# Patient Record
Sex: FEMALE | Race: WHITE | Employment: UNEMPLOYED | URBAN - METROPOLITAN AREA
[De-identification: names, ages, dates, MRNs, and addresses within clinical notes are randomized per-mention and may not be internally consistent; named-entity substitution may affect disease eponyms.]

---

## 2017-06-09 ENCOUNTER — GENERIC CONVERSION - ENCOUNTER (OUTPATIENT)
Dept: OTHER | Facility: OTHER | Age: 42
End: 2017-06-09

## 2017-06-12 DIAGNOSIS — R92.8 OTHER ABNORMAL AND INCONCLUSIVE FINDINGS ON DIAGNOSTIC IMAGING OF BREAST: ICD-10-CM

## 2017-06-15 ENCOUNTER — GENERIC CONVERSION - ENCOUNTER (OUTPATIENT)
Dept: OTHER | Facility: OTHER | Age: 42
End: 2017-06-15

## 2018-01-09 NOTE — RESULT NOTES
Verified Results  (Q) THINPREP TIS PAP AND HPV mRNA E6/E7 REFLEX HPV 16,18/45 [id number] 12:00AM Leandro Schwarz     Test Name Result Flag Reference   CLINICAL INFORMATION:      G0 C   NO H/O ABNL PAP   LMP:      NONE GIVEN   PREV  PAP:      NONE GIVEN   PREV  BX:      NONE GIVEN   SOURCE:      Cervix, Endocervix   STATEMENT OF ADEQUACY:      Satisfactory for evaluation  Endocervical/transformation zone component  present  Age and/or menstrual status not provided   INTERPRETATION/RESULT:      Negative for intraepithelial lesion or malignancy  COMMENT:      This Pap test has been evaluated with computer  assisted technology  CYTOTECHNOLOGIST:      SARAH MORRISON(ASCP)  CT screening location: [address] [place] [address]   HPV mRNA E6/E7 Not Detected  Not Detected   This test was performed using the APTIMA HPV Assay ([employer] )  This assay detects E6/E7 viral messenger RNA (mRNA) from 14  high-risk HPV types (16,18,31,33,35,39,45,51,52,56,58,59,66,68)

## 2018-01-12 NOTE — MISCELLANEOUS
Message  6/13/17 1915: LMOM to call back  Has [address] contacted re rec for breast USG? BEO  6/19/17: f/u studies completed   [facility]      Signatures   Electronically signed by : AMERICO Barnett ; Jun 19 2017 12:59PM EST                       (Author)

## 2018-02-12 ENCOUNTER — OFFICE VISIT (OUTPATIENT)
Dept: OBGYN CLINIC | Facility: CLINIC | Age: 43
End: 2018-02-12
Payer: COMMERCIAL

## 2018-02-12 VITALS
BODY MASS INDEX: 23.66 KG/M2 | HEIGHT: 65 IN | WEIGHT: 142 LBS | DIASTOLIC BLOOD PRESSURE: 74 MMHG | SYSTOLIC BLOOD PRESSURE: 122 MMHG

## 2018-02-12 DIAGNOSIS — Z12.39 SCREENING BREAST EXAMINATION: ICD-10-CM

## 2018-02-12 DIAGNOSIS — N63.0 BREAST MASS: ICD-10-CM

## 2018-02-12 DIAGNOSIS — Z30.49 ENCOUNTER FOR SURVEILLANCE OF CONDOM CONTRACEPTION: ICD-10-CM

## 2018-02-12 DIAGNOSIS — Z01.411 ENCNTR FOR GYN EXAM (GENERAL) (ROUTINE) W ABNORMAL FINDINGS: Primary | ICD-10-CM

## 2018-02-12 DIAGNOSIS — R92.8 ABNORMAL MAMMOGRAM: ICD-10-CM

## 2018-02-12 DIAGNOSIS — Z20.2 EXPOSURE TO SEXUALLY TRANSMITTED DISEASE (STD): ICD-10-CM

## 2018-02-12 DIAGNOSIS — Z20.828 EXPOSURE TO VIRAL DISEASE: ICD-10-CM

## 2018-02-12 DIAGNOSIS — E58 DIETARY CALCIUM DEFICIENCY: ICD-10-CM

## 2018-02-12 PROCEDURE — S0612 ANNUAL GYNECOLOGICAL EXAMINA: HCPCS | Performed by: OBSTETRICS & GYNECOLOGY

## 2018-02-12 NOTE — PROGRESS NOTES
Pt is a 38 y o  [place] with Patient's last menstrual period was 2018 (exact date)  using condoms (male) for [facility] presents for preventive care  She notes the same partner but strong suspicion for his infidelity partner since her last STI evaluation  In her lifetime she has been involved with <5 partners   Safe sexual practices (monogomy, condoms) as above followed consistently  · She does  feel physically safe in the relationship  She does feel safe in her home  · Her calcium intake encompasses mvi and cheese for a total of 1-2 servings daily on average  She does not take additional Vitamin D (MVI or supplement)  · She exercises 6-7 times per week  Mucks stalls daily  · Her menses occur every month, last 4-5 days and require maxipad every 4-5 hours  Menstrual History:  OB History      Para Term  AB Living    0 0 0 0 0 0    SAB TAB Ectopic Multiple Live Births    0 0 0 0 0            ·      · She has never recieved an HPV vaccine  · tobacco use : does not use tobacco              · colonoscopy     Past Medical History:   Diagnosis Date    Benign neoplasm parotid gland     LEFT SUPERFICIAL PAROTID GLAND, BENIGN    Chlamydia     19-ROUTINE SCREEN    Depression     NO MEDS- DR Baltazar Powers    Gonorrhea     19- ROUTINE SCREEN    Simple ovarian cyst 10/2010    RIGHT, SIMPLE [id number] MM  STABLE THROUGH AT LEAST     Varicella        Past Surgical History:   Procedure Laterality Date    BREAST BIOPSY      : RIGHT BREAST, BENIGN  DR Bertha Murillo; 5/15: LEFT BREAST, BENIGN  HEMATOMA P-OP 9/15; 11/15: LEFT BENIGN CYST AND WALL; 9/15: LEFT AS REACCUMULATED AND NOW COMPOUND CYSTIC STRUCTURE    BREAST BIOPSY      : MULTIPLE, BENIGN DR VILLALTA; 5/15: LEFT BENIGN    KNEE SURGERY Left     REPAIR ANKLE LIGAMENT      I ANKLE BONE SPUR    SALIVARY GLAND SURGERY Left     EXCISION OF PARATID TUMOR/GLAND; PAROTID (SUPERFICIAL), BENIGN    SHOULDER SURGERY Right        OB History    Para Term  AB Living   0 0 0 0 0 0   SAB TAB Ectopic Multiple Live Births   0 0 0 0 0             Gyn HX:  chlamydia  and gonorrhea       Current Outpatient Prescriptions:     Multiple Vitamins-Minerals (MULTIVITAL) tablet, Take by mouth, Disp: , Rfl:     pramoxine (PROCTOFOAM) 1 % foam, INSERT INTO THE RECTUM EVERY 4 (FOUR) HOURS AS NEEDED FOR HEMORRHOIDS , Disp: , Rfl: 0    No Known Allergies    Social History     Social History    Marital status: /Civil Union     Spouse name: N/A    Number of children: N/A    Years of education: N/A     Social History Main Topics    Smoking status: Former Smoker     Packs/day: 1 00    Smokeless tobacco: Never Used      Comment: 19-25 yo     Alcohol use No    Drug use: No    Sexual activity: Yes     Partners: Male     Birth control/ protection: Condom Male     Other Topics Concern    None     Social History Narrative    None       Family History   Problem Relation Age of Onset    Depression Mother     Hyperlipidemia Mother     Hypertension Mother     Coronary artery disease Maternal Grandmother     Cervical cancer Maternal Grandmother      IN 50'S    Diabetes type II Paternal Grandmother     Diabetes type II Other        Blood pressure 122/74, height 5' 5" (1 651 m), weight 64 4 kg (142 lb), last menstrual period 2018  and Body mass index is 23 63 kg/m²  Physical Exam  Breast:multiple bilateral masses of varying sizes c/w hx multiple bilateral cysts   vulva: 3cm right  Bartholin's cyst, NT  vagina: color pink, rugae  well formed rugae and discharge  white  cervix: nullip, no lesions  and -CMT  uterus: NSSC, AF, NT, mobile  adnexa: no masses or tenderness  rectum: no masses or nodularity      A/P:  Pt is a 38 y o  [place] with slowly enlarging B's duct cyst   Pt states I&D x 2 in past   Option for Marsupialization presented  Pt desires to observe  Precuations reviewed      Diagnoses and all orders for this visit:    Encntr for gyn exam (general) (routine) w abnormal findings    Screening breast examination    Breast mass  -     Mammo diagnostic bilateral w 3d & cad; Future  -     US breast right limited (diagnostic); Future  -     US breast left limited (diagnostic); Future    Abnormal mammogram  -     Mammo diagnostic bilateral w 3d & cad; Future  -     US breast right limited (diagnostic); Future  -     US breast left limited (diagnostic); Future    Dietary calcium deficiency    Encounter for surveillance of condom contraception    Exposure to sexually transmitted disease (STD)  -     Chlamydia/N  gonorrhoeae RNA, TMA  -     RPR (Monitor) W/Refl Titer    Exposure to viral disease  -     HIV 1/2 Antigen/Antibody,Fourth Generation W/Rfl  -     Hepatitis B core antibody, IgM  -     Hepatitis B surface antibody;  Future  -     Hepatitis C antibody  -     Herpes I/II IgG Antibodies  -     Hepatitis B surface antibody

## 2018-02-13 LAB
C TRACH RRNA SPEC QL NAA+PROBE: NOT DETECTED
HBV CORE IGM SERPL QL IA: NORMAL
HBV SURFACE AB SERPL IA-ACNC: <5 MIU/ML
HCV AB S/CO SERPL IA: 0.01
HCV AB SERPL QL IA: NORMAL
HIV 1+2 AB+HIV1 P24 AG SERPL QL IA: NORMAL
HSV1 IGG SER IA-ACNC: <0.9 INDEX
HSV2 IGG SER IA-ACNC: <0.9 INDEX
N GONORRHOEA RRNA SPEC QL NAA+PROBE: NOT DETECTED
RPR SER QL: NORMAL

## 2019-06-17 DIAGNOSIS — N63.0 BREAST MASS: ICD-10-CM

## 2019-06-17 DIAGNOSIS — R92.8 ABNORMAL MAMMOGRAM: ICD-10-CM

## 2019-11-02 ENCOUNTER — APPOINTMENT (OUTPATIENT)
Dept: URGENT CARE | Age: 44
End: 2019-11-02

## 2019-11-02 ENCOUNTER — TRANSCRIBE ORDERS (OUTPATIENT)
Dept: URGENT CARE | Age: 44
End: 2019-11-02

## 2019-11-02 ENCOUNTER — APPOINTMENT (OUTPATIENT)
Dept: LAB | Age: 44
End: 2019-11-02

## 2019-11-02 DIAGNOSIS — Z13.9 SCREENING FOR UNSPECIFIED CONDITION: Primary | ICD-10-CM

## 2019-11-02 LAB
CHOLEST SERPL-MCNC: 208 MG/DL (ref 50–200)
GLUCOSE P FAST SERPL-MCNC: 86 MG/DL (ref 65–99)
HDLC SERPL-MCNC: 50 MG/DL
LDLC SERPL CALC-MCNC: 140 MG/DL (ref 0–100)
NONHDLC SERPL-MCNC: 158 MG/DL
TRIGL SERPL-MCNC: 89 MG/DL

## 2019-11-02 PROCEDURE — 80323 ALKALOIDS NOS: CPT

## 2019-11-02 PROCEDURE — 80061 LIPID PANEL: CPT

## 2019-11-02 PROCEDURE — 36415 COLL VENOUS BLD VENIPUNCTURE: CPT

## 2019-11-02 PROCEDURE — 82947 ASSAY GLUCOSE BLOOD QUANT: CPT

## 2019-11-08 LAB
COTININE SERPL-MCNC: NORMAL NG/ML
NICOTINE SERPL-MCNC: NORMAL NG/ML

## 2020-10-29 ENCOUNTER — OFFICE VISIT (OUTPATIENT)
Dept: FAMILY MEDICINE CLINIC | Facility: CLINIC | Age: 45
End: 2020-10-29
Payer: COMMERCIAL

## 2020-10-29 ENCOUNTER — HOSPITAL ENCOUNTER (OUTPATIENT)
Dept: CT IMAGING | Facility: HOSPITAL | Age: 45
Discharge: HOME | End: 2020-10-29
Payer: COMMERCIAL

## 2020-10-29 VITALS
SYSTOLIC BLOOD PRESSURE: 122 MMHG | OXYGEN SATURATION: 99 % | TEMPERATURE: 98.2 F | HEART RATE: 55 BPM | WEIGHT: 152 LBS | DIASTOLIC BLOOD PRESSURE: 80 MMHG | BODY MASS INDEX: 25.29 KG/M2

## 2020-10-29 DIAGNOSIS — Z13.29 THYROID DISORDER SCREENING: ICD-10-CM

## 2020-10-29 DIAGNOSIS — Z13.1 SCREENING FOR DIABETES MELLITUS: ICD-10-CM

## 2020-10-29 DIAGNOSIS — S06.0X0A CONCUSSION WITHOUT LOSS OF CONSCIOUSNESS, INITIAL ENCOUNTER: ICD-10-CM

## 2020-10-29 DIAGNOSIS — Z12.31 VISIT FOR SCREENING MAMMOGRAM: ICD-10-CM

## 2020-10-29 DIAGNOSIS — R00.1 SINUS BRADYCARDIA: ICD-10-CM

## 2020-10-29 DIAGNOSIS — Z01.419 VISIT FOR GYNECOLOGIC EXAMINATION: ICD-10-CM

## 2020-10-29 DIAGNOSIS — Z13.220 SCREENING, LIPID: ICD-10-CM

## 2020-10-29 DIAGNOSIS — M25.50 ARTHRALGIA, UNSPECIFIED JOINT: ICD-10-CM

## 2020-10-29 DIAGNOSIS — G89.29 CHRONIC MIDLINE LOW BACK PAIN WITHOUT SCIATICA: ICD-10-CM

## 2020-10-29 DIAGNOSIS — M54.50 CHRONIC MIDLINE LOW BACK PAIN WITHOUT SCIATICA: ICD-10-CM

## 2020-10-29 DIAGNOSIS — M25.572 ACUTE LEFT ANKLE PAIN: ICD-10-CM

## 2020-10-29 DIAGNOSIS — R55 VASOVAGAL SYNCOPE: ICD-10-CM

## 2020-10-29 DIAGNOSIS — Z13.21 ENCOUNTER FOR VITAMIN DEFICIENCY SCREENING: ICD-10-CM

## 2020-10-29 DIAGNOSIS — Z13.89 SCREENING FOR GENITOURINARY CONDITION: ICD-10-CM

## 2020-10-29 DIAGNOSIS — L91.8 CUTANEOUS SKIN TAGS: ICD-10-CM

## 2020-10-29 DIAGNOSIS — Z76.89 ENCOUNTER TO ESTABLISH CARE WITH NEW DOCTOR: Primary | ICD-10-CM

## 2020-10-29 PROCEDURE — 70450 CT HEAD/BRAIN W/O DYE: CPT

## 2020-10-29 PROCEDURE — 3725F SCREEN DEPRESSION PERFORMED: CPT | Performed by: INTERNAL MEDICINE

## 2020-10-29 PROCEDURE — G1004 CDSM NDSC: HCPCS

## 2020-10-29 PROCEDURE — 99204 OFFICE O/P NEW MOD 45 MIN: CPT | Performed by: INTERNAL MEDICINE

## 2020-11-19 ENCOUNTER — HOSPITAL ENCOUNTER (OUTPATIENT)
Dept: NON INVASIVE DIAGNOSTICS | Facility: HOSPITAL | Age: 45
Discharge: HOME | End: 2020-11-19
Payer: COMMERCIAL

## 2020-11-19 DIAGNOSIS — R00.1 SINUS BRADYCARDIA: ICD-10-CM

## 2020-11-19 DIAGNOSIS — R55 VASOVAGAL SYNCOPE: ICD-10-CM

## 2020-11-19 PROCEDURE — 93226 XTRNL ECG REC<48 HR SCAN A/R: CPT

## 2020-11-19 PROCEDURE — 93225 XTRNL ECG REC<48 HRS REC: CPT

## 2020-11-21 ENCOUNTER — HOSPITAL ENCOUNTER (OUTPATIENT)
Dept: RADIOLOGY | Facility: HOSPITAL | Age: 45
Discharge: HOME | End: 2020-11-21
Payer: COMMERCIAL

## 2020-11-21 DIAGNOSIS — M25.572 ACUTE LEFT ANKLE PAIN: ICD-10-CM

## 2020-11-21 DIAGNOSIS — M54.50 CHRONIC MIDLINE LOW BACK PAIN WITHOUT SCIATICA: ICD-10-CM

## 2020-11-21 DIAGNOSIS — G89.29 CHRONIC MIDLINE LOW BACK PAIN WITHOUT SCIATICA: ICD-10-CM

## 2020-11-21 DIAGNOSIS — S06.0X0A CONCUSSION WITHOUT LOSS OF CONSCIOUSNESS, INITIAL ENCOUNTER: ICD-10-CM

## 2020-11-21 PROCEDURE — 72100 X-RAY EXAM L-S SPINE 2/3 VWS: CPT

## 2020-11-21 PROCEDURE — 73610 X-RAY EXAM OF ANKLE: CPT

## 2020-11-23 LAB
25(OH)D3 SERPL-MCNC: 24 NG/ML (ref 30–100)
ALBUMIN SERPL-MCNC: 4.5 G/DL (ref 3.6–5.1)
ALBUMIN/GLOB SERPL: 1.8 (CALC) (ref 1–2.5)
ALP SERPL-CCNC: 57 U/L (ref 31–125)
ALT SERPL-CCNC: 13 U/L (ref 6–29)
APPEARANCE UR: CLEAR
AST SERPL-CCNC: 14 U/L (ref 10–30)
B BURGDOR AB SER IA-ACNC: <0.9 INDEX
BACTERIA UR QL AUTO: ABNORMAL /HPF
BASOPHILS # BLD AUTO: 92 CELLS/UL (ref 0–200)
BASOPHILS NFR BLD AUTO: 1.7 %
BILIRUB SERPL-MCNC: 0.5 MG/DL (ref 0.2–1.2)
BILIRUB UR QL STRIP: NEGATIVE
BUN SERPL-MCNC: 9 MG/DL (ref 7–25)
BUN/CREAT SERPL: NORMAL (CALC) (ref 6–22)
CALCIUM SERPL-MCNC: 9.7 MG/DL (ref 8.6–10.2)
CHLORIDE SERPL-SCNC: 106 MMOL/L (ref 98–110)
CHOLEST SERPL-MCNC: 212 MG/DL
CHOLEST/HDLC SERPL: 4.4 (CALC)
CO2 SERPL-SCNC: 26 MMOL/L (ref 20–32)
COLOR UR: YELLOW
CREAT SERPL-MCNC: 0.78 MG/DL (ref 0.5–1.1)
EOSINOPHIL # BLD AUTO: 302 CELLS/UL (ref 15–500)
EOSINOPHIL NFR BLD AUTO: 5.6 %
ERYTHROCYTE [DISTWIDTH] IN BLOOD BY AUTOMATED COUNT: 15.3 % (ref 11–15)
GLOBULIN SER CALC-MCNC: 2.5 G/DL (CALC) (ref 1.9–3.7)
GLUCOSE SERPL-MCNC: 82 MG/DL (ref 65–99)
GLUCOSE UR QL STRIP: NEGATIVE
HCT VFR BLD AUTO: 34.3 % (ref 35–45)
HDLC SERPL-MCNC: 48 MG/DL
HGB BLD-MCNC: 10.8 G/DL (ref 11.7–15.5)
HGB UR QL STRIP: ABNORMAL
HYALINE CASTS #/AREA URNS LPF: ABNORMAL /LPF
KETONES UR QL STRIP: NEGATIVE
LDLC SERPL CALC-MCNC: 143 MG/DL (CALC)
LEUKOCYTE ESTERASE UR QL STRIP: NEGATIVE
LYMPHOCYTES # BLD AUTO: 1447 CELLS/UL (ref 850–3900)
LYMPHOCYTES NFR BLD AUTO: 26.8 %
MCH RBC QN AUTO: 25.7 PG (ref 27–33)
MCHC RBC AUTO-ENTMCNC: 31.5 G/DL (ref 32–36)
MCV RBC AUTO: 81.5 FL (ref 80–100)
MONOCYTES # BLD AUTO: 432 CELLS/UL (ref 200–950)
MONOCYTES NFR BLD AUTO: 8 %
NEUTROPHILS # BLD AUTO: 3127 CELLS/UL (ref 1500–7800)
NEUTROPHILS NFR BLD AUTO: 57.9 %
NITRITE UR QL STRIP: NEGATIVE
NONHDLC SERPL-MCNC: 164 MG/DL (CALC)
PH UR STRIP: 7 [PH] (ref 5–8)
PLATELET # BLD AUTO: 356 THOUSAND/UL (ref 140–400)
PMV BLD REES-ECKER: 10.7 FL (ref 7.5–12.5)
POTASSIUM SERPL-SCNC: 4.2 MMOL/L (ref 3.5–5.3)
PROT SERPL-MCNC: 7 G/DL (ref 6.1–8.1)
PROT UR QL STRIP: NEGATIVE
RBC # BLD AUTO: 4.21 MILLION/UL (ref 3.8–5.1)
RBC #/AREA URNS HPF: ABNORMAL /HPF
SL AMB EGFR AFRICAN AMERICAN: 108 ML/MIN/1.73M2
SL AMB EGFR NON AFRICAN AMERICAN: 93 ML/MIN/1.73M2
SODIUM SERPL-SCNC: 139 MMOL/L (ref 135–146)
SP GR UR STRIP: 1 (ref 1–1.03)
SQUAMOUS #/AREA URNS HPF: ABNORMAL /HPF
TRIGL SERPL-MCNC: 98 MG/DL
TSH SERPL-ACNC: 2.04 MIU/L
WBC # BLD AUTO: 5.4 THOUSAND/UL (ref 3.8–10.8)
WBC #/AREA URNS HPF: ABNORMAL /HPF

## 2020-11-25 ENCOUNTER — TELEPHONE (OUTPATIENT)
Dept: FAMILY MEDICINE CLINIC | Facility: CLINIC | Age: 45
End: 2020-11-25

## 2020-11-27 PROCEDURE — 93227 XTRNL ECG REC<48 HR R&I: CPT | Performed by: INTERNAL MEDICINE

## 2020-11-30 ENCOUNTER — OFFICE VISIT (OUTPATIENT)
Dept: FAMILY MEDICINE CLINIC | Facility: CLINIC | Age: 45
End: 2020-11-30
Payer: COMMERCIAL

## 2020-11-30 VITALS
WEIGHT: 153 LBS | HEART RATE: 64 BPM | BODY MASS INDEX: 25.49 KG/M2 | SYSTOLIC BLOOD PRESSURE: 100 MMHG | DIASTOLIC BLOOD PRESSURE: 62 MMHG | TEMPERATURE: 98.5 F | HEIGHT: 65 IN | OXYGEN SATURATION: 99 %

## 2020-11-30 DIAGNOSIS — E55.9 VITAMIN D DEFICIENCY: ICD-10-CM

## 2020-11-30 DIAGNOSIS — R31.9 HEMATURIA, UNSPECIFIED TYPE: ICD-10-CM

## 2020-11-30 DIAGNOSIS — E78.49 OTHER HYPERLIPIDEMIA: ICD-10-CM

## 2020-11-30 DIAGNOSIS — D64.9 ANEMIA, UNSPECIFIED TYPE: Primary | ICD-10-CM

## 2020-11-30 PROCEDURE — 99213 OFFICE O/P EST LOW 20 MIN: CPT | Performed by: INTERNAL MEDICINE

## 2020-11-30 PROCEDURE — 3008F BODY MASS INDEX DOCD: CPT | Performed by: INTERNAL MEDICINE

## 2020-12-03 LAB
FERRITIN SERPL-MCNC: 5 NG/ML (ref 16–232)
IRON SATN MFR SERPL: 6 % (CALC) (ref 16–45)
IRON SERPL-MCNC: 21 MCG/DL (ref 40–190)
TIBC SERPL-MCNC: 365 MCG/DL (CALC) (ref 250–450)
VIT B12 SERPL-MCNC: 460 PG/ML (ref 200–1100)

## 2020-12-04 ENCOUNTER — TELEPHONE (OUTPATIENT)
Dept: FAMILY MEDICINE CLINIC | Facility: CLINIC | Age: 45
End: 2020-12-04

## 2020-12-07 ENCOUNTER — TELEPHONE (OUTPATIENT)
Dept: FAMILY MEDICINE CLINIC | Facility: CLINIC | Age: 45
End: 2020-12-07

## 2020-12-07 PROBLEM — D64.9 ANEMIA: Status: ACTIVE | Noted: 2020-12-07

## 2020-12-07 RX ORDER — SODIUM CHLORIDE 9 MG/ML
20 INJECTION, SOLUTION INTRAVENOUS ONCE
Status: CANCELLED | OUTPATIENT
Start: 2020-12-08

## 2020-12-15 ENCOUNTER — TELEPHONE (OUTPATIENT)
Dept: FAMILY MEDICINE CLINIC | Facility: CLINIC | Age: 45
End: 2020-12-15

## 2020-12-29 ENCOUNTER — HOSPITAL ENCOUNTER (OUTPATIENT)
Dept: INFUSION CENTER | Facility: CLINIC | Age: 45
Discharge: HOME | End: 2020-12-29
Payer: COMMERCIAL

## 2020-12-29 VITALS
TEMPERATURE: 98.4 F | DIASTOLIC BLOOD PRESSURE: 77 MMHG | SYSTOLIC BLOOD PRESSURE: 135 MMHG | HEART RATE: 78 BPM | RESPIRATION RATE: 20 BRPM

## 2020-12-29 DIAGNOSIS — D64.9 ANEMIA, UNSPECIFIED TYPE: Primary | ICD-10-CM

## 2020-12-29 PROCEDURE — 96365 THER/PROPH/DIAG IV INF INIT: CPT

## 2020-12-29 RX ORDER — SODIUM CHLORIDE 9 MG/ML
20 INJECTION, SOLUTION INTRAVENOUS ONCE
Status: COMPLETED | OUTPATIENT
Start: 2020-12-29 | End: 2020-12-29

## 2020-12-29 RX ORDER — SODIUM CHLORIDE 9 MG/ML
20 INJECTION, SOLUTION INTRAVENOUS ONCE
Status: CANCELLED | OUTPATIENT
Start: 2021-01-05

## 2020-12-29 RX ORDER — OMEGA-3S/DHA/EPA/FISH OIL/D3 300MG-1000
400 CAPSULE ORAL DAILY
COMMUNITY

## 2020-12-29 RX ORDER — MULTIVITAMIN
1 TABLET ORAL DAILY
COMMUNITY

## 2020-12-29 RX ADMIN — IRON SUCROSE 100 MG: 20 INJECTION, SOLUTION INTRAVENOUS at 14:08

## 2020-12-29 RX ADMIN — SODIUM CHLORIDE 20 ML/HR: 0.9 INJECTION, SOLUTION INTRAVENOUS at 14:08

## 2020-12-29 NOTE — PLAN OF CARE
Problem: Potential for Falls  Goal: Patient will remain free of falls  Description: INTERVENTIONS:  - Assess patient frequently for physical needs  -  Identify cognitive and physical deficits and behaviors that affect risk of falls    -  [place] fall precautions as indicated by assessment   - Educate patient/family on patient safety including physical limitations  - Instruct patient to call for assistance with activity based on assessment  - Modify environment to reduce risk of injury  - Consider OT/PT consult to assist with strengthening/mobility  Outcome: Progressing

## 2020-12-29 NOTE — PROGRESS NOTES
Pt arrived to unit without complaint  Venofer 100mg administered as per orders and clarification with Dr Erwin Hidden via [facility] text  Pt tolerated well, AVS provided  Pt left unit in stable condition without question or concern

## 2020-12-30 DIAGNOSIS — D64.9 ANEMIA, UNSPECIFIED TYPE: Primary | ICD-10-CM

## 2020-12-30 RX ORDER — SODIUM CHLORIDE 9 MG/ML
20 INJECTION, SOLUTION INTRAVENOUS ONCE
Status: CANCELLED | OUTPATIENT
Start: 2020-12-30

## 2020-12-31 ENCOUNTER — TELEPHONE (OUTPATIENT)
Dept: FAMILY MEDICINE CLINIC | Facility: CLINIC | Age: 45
End: 2020-12-31

## 2021-01-05 ENCOUNTER — CONSULT (OUTPATIENT)
Dept: GASTROENTEROLOGY | Facility: MEDICAL CENTER | Age: 46
End: 2021-01-05
Payer: COMMERCIAL

## 2021-01-05 VITALS
DIASTOLIC BLOOD PRESSURE: 68 MMHG | HEIGHT: 65 IN | WEIGHT: 155 LBS | HEART RATE: 65 BPM | SYSTOLIC BLOOD PRESSURE: 147 MMHG | BODY MASS INDEX: 25.83 KG/M2 | TEMPERATURE: 98 F

## 2021-01-05 DIAGNOSIS — R31.9 HEMATURIA, UNSPECIFIED TYPE: ICD-10-CM

## 2021-01-05 DIAGNOSIS — D64.9 ANEMIA, UNSPECIFIED TYPE: ICD-10-CM

## 2021-01-05 DIAGNOSIS — D50.8 OTHER IRON DEFICIENCY ANEMIA: Primary | ICD-10-CM

## 2021-01-05 PROCEDURE — 99204 OFFICE O/P NEW MOD 45 MIN: CPT | Performed by: INTERNAL MEDICINE

## 2021-01-05 PROCEDURE — 3008F BODY MASS INDEX DOCD: CPT | Performed by: INTERNAL MEDICINE

## 2021-01-05 PROCEDURE — 1036F TOBACCO NON-USER: CPT | Performed by: INTERNAL MEDICINE

## 2021-01-05 RX ORDER — SODIUM, POTASSIUM,MAG SULFATES 17.5-3.13G
1 SOLUTION, RECONSTITUTED, ORAL ORAL ONCE
Qty: 1 BOTTLE | Refills: 0 | Status: SHIPPED | OUTPATIENT
Start: 2021-01-05 | End: 2021-04-15

## 2021-01-05 NOTE — PATIENT INSTRUCTIONS
The patient is scheduled at [facility] for a colon/egd with Anibal Frank on 03/19/2021  suprep prep instructions have been gone over in the office, with the patient, by the MA  The patient is aware that they will receive a call with the arrival time the day prior to procedure and that they will need a  the day of the procedure   I have asked the patient to call with any questions that they might have prior to procedure

## 2021-01-05 NOTE — PROGRESS NOTES
[facility] - Outpatient Consultation  Lauren Miles 39 y o  female MRN: [id number]  Encounter: [id number]          ASSESSMENT AND PLAN:            1  Other iron deficiency anemia  2  Hematuria    Patient iron deficiency anemia is most likely caused by heavy menstrual loss  However I would recommend to do an EGD and colonoscopy to rule out occult GI lesions  Labs to rule out celiac disease  Plan for duodenal biopsy to rule out celiac disease     - Celiac Disease Antibody Profile; Future  - Na Sulfate-K Sulfate-Mg Sulf (Suprep Bowel Prep Kit) 17 5-3 13-1 6 GM/177ML SOLN; Take 1 Bottle by mouth once for 1 dose  Dispense: 1 Bottle; Refill: 0  - Colonoscopy; Future  - EGD; Future    ______________________________________________________________________    HPI:      43-year-old female referred by Dr Jesika Chaidez for evaluation of iron deficiency anemia  Patient was found to have iron deficiency anemia during routine checkup  She has normocytic anemia with hemoglobin of 10 8 in iron panel showed iron deficiency  Her fecal occult blood test was pending  She denies any overt GI symptoms  She has been avoiding dairy product in her diet and she denies abdominal pain  She overall has regular bowel movement  Denies blood in the stool  She reported intermittent nausea but no vomiting  She denies sense of incomplete evacuation  She never had an upper endoscopy and colonoscopy  She reported heavy menstrual cycles  She reported sometimes her urine has blood during her menstrual cycle  REVIEW OF SYSTEMS:    CONSTITUTIONAL: Denies any fever, chills, rigors, and weight loss  HEENT: No earache or tinnitus  Denies hearing loss or visual disturbances  CARDIOVASCULAR: No chest pain or palpitations  RESPIRATORY: Denies any cough, hemoptysis, shortness of breath or dyspnea on exertion  GASTROINTESTINAL: As noted in the History of Present Illness  GENITOURINARY: No problems with urination   Denies any hematuria or dysuria  NEUROLOGIC: No dizziness or vertigo, denies headaches  MUSCULOSKELETAL: Denies any muscle or joint pain  SKIN: Denies skin rashes or itching  ENDOCRINE: Denies excessive thirst  Denies intolerance to heat or cold  PSYCHOSOCIAL: Denies depression or anxiety  Denies any recent memory loss  Historical Information   Past Medical History:   Diagnosis Date    Benign neoplasm parotid gland     LEFT SUPERFICIAL PAROTID GLAND, BENIGN    Chlamydia     19-ROUTINE SCREEN    Depression     NO MEDS- DR Michelle Macias    Gonorrhea     25- ROUTINE SCREEN    Simple ovarian cyst 10/2010    RIGHT, SIMPLE [id number] MM  STABLE THROUGH AT LEAST 9/13    Varicella      Past Surgical History:   Procedure Laterality Date    BREAST BIOPSY      2011: RIGHT BREAST, BENIGN  DR Amelia Ruvalcaba; 5/15: LEFT BREAST, BENIGN  HEMATOMA P-OP 9/15; 11/15: LEFT BENIGN CYST AND WALL; 9/15: LEFT AS REACCUMULATED AND NOW COMPOUND CYSTIC STRUCTURE    BREAST BIOPSY      2003/04: MULTIPLE, BENIGN DR Amelia Ruvalcaba; 5/15: LEFT BENIGN    BREAST LUMPECTOMY      L breast     KNEE SURGERY Left     REPAIR ANKLE LIGAMENT      I ANKLE BONE SPUR    SALIVARY GLAND SURGERY Left 2012    EXCISION OF PARATID TUMOR/GLAND; PAROTID (SUPERFICIAL), BENIGN    SHOULDER SURGERY Right      Social History   Social History     Substance and Sexual Activity   Alcohol Use No     Social History     Substance and Sexual Activity   Drug Use No     Social History     Tobacco Use   Smoking Status Former Smoker    Packs/day: 1 00   Smokeless Tobacco Never Used   Tobacco Comment    19-25 yo      Family History   Problem Relation Age of Onset    Depression Mother     Hyperlipidemia Mother     Hypertension Mother     Coronary artery disease Maternal Grandmother     Cervical cancer Maternal Grandmother         IN 50'S    Diabetes type II Paternal Grandmother     Diabetes type II Other        Meds/Allergies       Current Outpatient Medications:    cholecalciferol (VITAMIN D3) 400 units tablet    Multiple Vitamin (multivitamin) tablet    Na Sulfate-K Sulfate-Mg Sulf (Suprep Bowel Prep Kit) 17 5-3 13-1 6 GM/177ML SOLN    No Known Allergies        Objective     Blood pressure 147/68, pulse 65, temperature 98 °F (36 7 °C), temperature source Tympanic Core, height 5' 5" (1 651 m), weight 70 3 kg (155 lb), last menstrual period 12/08/2020, not currently breastfeeding  Body mass index is 25 79 kg/m²  PHYSICAL EXAM:      General Appearance:   Alert, cooperative, no distress   HEENT:   Normocephalic, atraumatic, anicteric      Neck:  Supple, symmetrical, trachea midline   Lungs:   Clear to auscultation bilaterally; no rales, rhonchi or wheezing; respirations unlabored    Heart[de-identified]   Regular rate and rhythm; no murmur, rub, or gallop  Abdomen:   Soft, non-tender, non-distended; normal bowel sounds; no masses, no organomegaly    Genitalia:   Deferred    Rectal:   Deferred    Extremities:  No cyanosis, clubbing or edema    Pulses:  2+ and symmetric    Skin:  No jaundice, rashes, or lesions    Lymph nodes:  No palpable cervical lymphadenopathy        Lab Results:   No visits with results within 1 Day(s) from this visit  Latest known visit with results is:   Office Visit on 11/30/2020   Component Date Value    Vitamin B-12 12/02/2020 460     Iron, Serum 12/02/2020 21*    Total Iron Binding [place]* 12/02/2020 365     Iron Saturation 12/02/2020 6*    Ferritin 12/02/2020 5*         Radiology Results:   No results found

## 2021-01-07 ENCOUNTER — HOSPITAL ENCOUNTER (OUTPATIENT)
Dept: INFUSION CENTER | Facility: CLINIC | Age: 46
Discharge: HOME | End: 2021-01-07
Payer: COMMERCIAL

## 2021-01-07 VITALS
HEART RATE: 63 BPM | DIASTOLIC BLOOD PRESSURE: 76 MMHG | SYSTOLIC BLOOD PRESSURE: 128 MMHG | RESPIRATION RATE: 18 BRPM | TEMPERATURE: 97.9 F

## 2021-01-07 DIAGNOSIS — D64.9 ANEMIA, UNSPECIFIED TYPE: Primary | ICD-10-CM

## 2021-01-07 PROCEDURE — 96365 THER/PROPH/DIAG IV INF INIT: CPT

## 2021-01-07 RX ORDER — SODIUM CHLORIDE 9 MG/ML
20 INJECTION, SOLUTION INTRAVENOUS ONCE
Status: COMPLETED | OUTPATIENT
Start: 2021-01-07 | End: 2021-01-07

## 2021-01-07 RX ORDER — SODIUM CHLORIDE 9 MG/ML
20 INJECTION, SOLUTION INTRAVENOUS ONCE
Status: CANCELLED | OUTPATIENT
Start: 2021-01-13

## 2021-01-07 RX ADMIN — IRON SUCROSE 100 MG: 20 INJECTION, SOLUTION INTRAVENOUS at 09:30

## 2021-01-07 RX ADMIN — SODIUM CHLORIDE 20 ML/HR: 0.9 INJECTION, SOLUTION INTRAVENOUS at 09:13

## 2021-01-07 NOTE — PLAN OF CARE
Problem: Potential for Falls  Goal: Patient will remain free of falls  Description: INTERVENTIONS:  - Assess patient frequently for physical needs  -  Identify cognitive and physical deficits and behaviors that affect risk of falls    -  [place] fall precautions as indicated by assessment   - Educate patient/family on patient safety including physical limitations  - Instruct patient to call for assistance with activity based on assessment  - Modify environment to reduce risk of injury  - Consider OT/PT consult to assist with strengthening/mobility  Outcome: Progressing

## 2021-01-13 ENCOUNTER — HOSPITAL ENCOUNTER (OUTPATIENT)
Dept: INFUSION CENTER | Facility: CLINIC | Age: 46
Discharge: HOME | End: 2021-01-13
Payer: COMMERCIAL

## 2021-01-13 VITALS
DIASTOLIC BLOOD PRESSURE: 85 MMHG | RESPIRATION RATE: 18 BRPM | SYSTOLIC BLOOD PRESSURE: 134 MMHG | HEART RATE: 66 BPM | TEMPERATURE: 97.6 F

## 2021-01-13 DIAGNOSIS — D64.9 ANEMIA, UNSPECIFIED TYPE: Primary | ICD-10-CM

## 2021-01-13 PROCEDURE — 96365 THER/PROPH/DIAG IV INF INIT: CPT

## 2021-01-13 RX ORDER — SODIUM CHLORIDE 9 MG/ML
20 INJECTION, SOLUTION INTRAVENOUS ONCE
Status: CANCELLED | OUTPATIENT
Start: 2021-01-20

## 2021-01-13 RX ORDER — SODIUM CHLORIDE 9 MG/ML
20 INJECTION, SOLUTION INTRAVENOUS ONCE
Status: COMPLETED | OUTPATIENT
Start: 2021-01-13 | End: 2021-01-13

## 2021-01-13 RX ADMIN — SODIUM CHLORIDE 20 ML/HR: 9 INJECTION, SOLUTION INTRAVENOUS at 09:45

## 2021-01-13 RX ADMIN — IRON SUCROSE 100 MG: 20 INJECTION, SOLUTION INTRAVENOUS at 09:45

## 2021-01-13 NOTE — PROGRESS NOTES
Patient tolerated Venofer infusion well with no adverse events or complications  Pt is aware of all future appointments   Declined AVS

## 2021-01-20 ENCOUNTER — HOSPITAL ENCOUNTER (OUTPATIENT)
Dept: INFUSION CENTER | Facility: CLINIC | Age: 46
Discharge: HOME | End: 2021-01-20
Payer: COMMERCIAL

## 2021-01-20 VITALS
RESPIRATION RATE: 18 BRPM | TEMPERATURE: 98 F | DIASTOLIC BLOOD PRESSURE: 72 MMHG | SYSTOLIC BLOOD PRESSURE: 129 MMHG | HEART RATE: 93 BPM

## 2021-01-20 DIAGNOSIS — D64.9 ANEMIA, UNSPECIFIED TYPE: Primary | ICD-10-CM

## 2021-01-20 PROCEDURE — 96365 THER/PROPH/DIAG IV INF INIT: CPT

## 2021-01-20 RX ORDER — SODIUM CHLORIDE 9 MG/ML
20 INJECTION, SOLUTION INTRAVENOUS ONCE
Status: COMPLETED | OUTPATIENT
Start: 2021-01-20 | End: 2021-01-20

## 2021-01-20 RX ORDER — SODIUM CHLORIDE 9 MG/ML
20 INJECTION, SOLUTION INTRAVENOUS ONCE
Status: CANCELLED | OUTPATIENT
Start: 2021-01-27

## 2021-01-20 RX ADMIN — IRON SUCROSE 100 MG: 20 INJECTION, SOLUTION INTRAVENOUS at 10:05

## 2021-01-20 RX ADMIN — SODIUM CHLORIDE 20 ML/HR: 0.9 INJECTION, SOLUTION INTRAVENOUS at 10:04

## 2021-01-20 NOTE — PLAN OF CARE
Problem: Knowledge Deficit  Goal: Patient/family/caregiver demonstrates understanding of disease process, treatment plan, medications, and discharge instructions  Description: Complete learning assessment and assess knowledge base  Interventions:  - Provide teaching at level of understanding  - Provide teaching via preferred learning methods  Outcome: Progressing     Problem: Potential for Falls  Goal: Patient will remain free of falls  Description: INTERVENTIONS:  - Assess patient frequently for physical needs  -  Identify cognitive and physical deficits and behaviors that affect risk of falls    -  [place] fall precautions as indicated by assessment   - Educate patient/family on patient safety including physical limitations  - Instruct patient to call for assistance with activity based on assessment  - Modify environment to reduce risk of injury  - Consider OT/PT consult to assist with strengthening/mobility  Outcome: Progressing

## 2021-01-27 ENCOUNTER — HOSPITAL ENCOUNTER (OUTPATIENT)
Dept: INFUSION CENTER | Facility: CLINIC | Age: 46
Discharge: HOME | End: 2021-01-27
Payer: COMMERCIAL

## 2021-01-27 VITALS
HEART RATE: 67 BPM | DIASTOLIC BLOOD PRESSURE: 76 MMHG | SYSTOLIC BLOOD PRESSURE: 114 MMHG | RESPIRATION RATE: 16 BRPM | TEMPERATURE: 98.1 F

## 2021-01-27 DIAGNOSIS — D64.9 ANEMIA, UNSPECIFIED TYPE: Primary | ICD-10-CM

## 2021-01-27 PROCEDURE — 96365 THER/PROPH/DIAG IV INF INIT: CPT

## 2021-01-27 RX ORDER — SODIUM CHLORIDE 9 MG/ML
20 INJECTION, SOLUTION INTRAVENOUS ONCE
Status: CANCELLED | OUTPATIENT
Start: 2021-02-03

## 2021-01-27 RX ORDER — SODIUM CHLORIDE 9 MG/ML
20 INJECTION, SOLUTION INTRAVENOUS ONCE
Status: COMPLETED | OUTPATIENT
Start: 2021-01-27 | End: 2021-01-27

## 2021-01-27 RX ADMIN — IRON SUCROSE 100 MG: 20 INJECTION, SOLUTION INTRAVENOUS at 10:05

## 2021-01-27 RX ADMIN — SODIUM CHLORIDE 20 ML/HR: 0.9 INJECTION, SOLUTION INTRAVENOUS at 09:40

## 2021-01-27 NOTE — PROGRESS NOTES
Pt  Tolerated Venofer without adverse event  There are no future orders for Venofer noted at this time  Pt  Will follow up with physician as ordered  AVS provided

## 2021-03-03 ENCOUNTER — HOSPITAL ENCOUNTER (OUTPATIENT)
Dept: MAMMOGRAPHY | Facility: MEDICAL CENTER | Age: 46
Discharge: HOME | End: 2021-03-03
Payer: COMMERCIAL

## 2021-03-03 VITALS — BODY MASS INDEX: 25.83 KG/M2 | HEIGHT: 65 IN | WEIGHT: 155 LBS

## 2021-03-03 DIAGNOSIS — Z12.31 VISIT FOR SCREENING MAMMOGRAM: ICD-10-CM

## 2021-03-03 PROCEDURE — 77063 BREAST TOMOSYNTHESIS BI: CPT

## 2021-03-03 PROCEDURE — 77067 SCR MAMMO BI INCL CAD: CPT

## 2021-03-08 ENCOUNTER — TELEPHONE (OUTPATIENT)
Dept: GASTROENTEROLOGY | Facility: CLINIC | Age: 46
End: 2021-03-08

## 2021-03-08 NOTE — TELEPHONE ENCOUNTER
Patients GI provider:  Dr Greene Spine    Number to return call: [phone]    Reason for call: Pt calling to cx her procedures and does not wish to r/s at this time      Scheduled procedure/appointment date if applicable: Procedure - 03/19/21

## 2021-03-17 ENCOUNTER — TELEPHONE (OUTPATIENT)
Dept: NEUROLOGY | Facility: CLINIC | Age: 46
End: 2021-03-17

## 2021-03-17 NOTE — TELEPHONE ENCOUNTER
Called and left a vm for pt that Dr Wanda Esparza has a cancellation tomorrow 3/18/21 at [address] [place] [address] in [address] [place] [address] office  If interested please call us to schedule  Apt is first come first served basis

## 2021-03-26 ENCOUNTER — TELEPHONE (OUTPATIENT)
Dept: NEUROLOGY | Facility: CLINIC | Age: 46
End: 2021-03-26

## 2021-03-26 NOTE — TELEPHONE ENCOUNTER
I called and left a voicemail message about patients upcoming appointment with     I requested a call back to our office if the patient has any issues or concerns or cannot keep this appointment

## 2021-04-07 NOTE — TELEPHONE ENCOUNTER
I called and left a voicemail message about patients upcoming appointment with     I requested a call back to our office if the patient has any issues or concerns or cannot keep this appointment  Called and spoke to pt's  Bassam Pena - he stated that he could not confirm pt's apt, however he will let pt know that we have been trying to get a hold of her to please call our office to confirm apt or reschedule if she cannot make it

## 2021-04-08 ENCOUNTER — CONSULT (OUTPATIENT)
Dept: NEUROLOGY | Facility: CLINIC | Age: 46
End: 2021-04-08
Payer: COMMERCIAL

## 2021-04-08 VITALS
HEIGHT: 65 IN | HEART RATE: 72 BPM | WEIGHT: 163 LBS | DIASTOLIC BLOOD PRESSURE: 58 MMHG | BODY MASS INDEX: 27.16 KG/M2 | SYSTOLIC BLOOD PRESSURE: 110 MMHG

## 2021-04-08 DIAGNOSIS — Z87.820 H/O CONCUSSION: ICD-10-CM

## 2021-04-08 DIAGNOSIS — G44.329 CHRONIC POST-TRAUMATIC HEADACHE, NOT INTRACTABLE: Primary | ICD-10-CM

## 2021-04-08 PROCEDURE — 1036F TOBACCO NON-USER: CPT | Performed by: PSYCHIATRY & NEUROLOGY

## 2021-04-08 PROCEDURE — 99205 OFFICE O/P NEW HI 60 MIN: CPT | Performed by: PSYCHIATRY & NEUROLOGY

## 2021-04-08 NOTE — PATIENT INSTRUCTIONS
"This might hurt" - thismighthurtfilm  com  "all the rage: Saved by Lanre Vega" - on [employer]     "healing back pain" By Dr Nick Amato is over  Post traumatic headaches should continue to improve with time     Headache/migraine treatment:   Abortive medications (for immediate treatment of a headache): Ok to take ibuprofen or acetaminophen for headaches, but try to limit the amount and frequency that you are taking to avoid medication overuse/rebound headache  Ideally no more than 3 days per week  Over the counter preventive supplements for headaches/migraines - try for 2-3 months at least   (to take every day to help prevent headaches - not to take at the time of headache):  - Magnesium 400mg daily  - Can occasionally cause stomach upset - if so try at night, with food or stop, rarely can cause diarrhea if so stop  Prescription preventive medications for headaches/migraines   (to take every day to help prevent headaches - not to take at the time of headache): Not indicated at this time     Lifestyle Recommendations:  - Maintain good sleep hygiene  Going to bed and waking up at consistent times, avoiding excessive daytime naps, avoiding caffeinated beverages in the evening, avoid excessive stimulation in the evening and generally using bed primarily for sleeping  One hour before bedtime would recommend turning lights down lower, decreasing your activity (may read quietly, listen to music at a low volume)  When you get into bed, should eliminate all technology (no texting, emailing, playing with your phone, iPad or tablet in bed)  - Maintain good hydration  Drink  2L of fluid a day (4 typical small water bottles)  - Maintain good nutrition  In particular don't skip meals and eat balanced meals regularly  Education and Follow-up  - Please contact us if any questions or concerns arise   Of course, try to protect yourself from head injuries, and if any new concerning symptoms or significant blow to the head or body go to the emergency department    - Follow up if needed

## 2021-04-08 NOTE — PROGRESS NOTES
Review of Systems    {LimROS-complete:33558}      Review of Systems   Constitutional: Negative  Negative for appetite change and fever  HENT: Negative  Negative for hearing loss, tinnitus, trouble swallowing and voice change  Eyes: Negative  Negative for photophobia and pain  Respiratory: Negative  Negative for shortness of breath  Cardiovascular: Negative  Negative for palpitations  Gastrointestinal: Negative  Negative for nausea and vomiting  Endocrine: Negative  Negative for cold intolerance  Genitourinary: Negative  Negative for dysuria, frequency and urgency  Musculoskeletal: Positive for back pain  Negative for myalgias and neck pain  Skin: Negative  Negative for rash  Neurological: Negative  Negative for dizziness, tremors, seizures, syncope, facial asymmetry, speech difficulty, weakness, light-headedness, numbness and headaches  Hematological: Negative  Does not bruise/bleed easily  Psychiatric/Behavioral: Negative  Negative for confusion, hallucinations and sleep disturbance

## 2021-04-08 NOTE — PROGRESS NOTES
[facility] [facility] Consult   PATIENT:  Deon Moreno  MRN:  [id number]  :  1975  DATE OF SERVICE:  2021  REFERRED BY: Bharath Rosenbaum MD  PMD: Kenji Conteh MD    Assessment:     Deon Moreno is a very pleasant 39 y o  female with a past medical history that includes depression, chronic low back pain, Iron deficiency anemia, vitamin-D deficiency,  Benign neoplasm of major salivary gland,  Dietary calcium deficiency (lactose intolerant), bilateral hearing loss per patient  referred here for evaluation of mild TBI/concussion  My initial evaluation 21    Chronic post-traumatic headache, not intractable  H/O concussion  In early 10/2020, she had vasovagal syncope at urgent care after ketorolac shot for low back pain, she fell to the floor and had typical acute symptoms of concussion  She was subsequently evaluated by primary care provider, noncontrast head CT was unremarkable  - as of 2021: all concussion symptoms have long since resolved  She reports she occasionally has headaches with some migrainous features about 2-4 per month that may resolve with tylenol, recommended she could try ibuprofen  Recommended magnesium for prevention  Workup:  - Neurocognitive assessment reveals normal neurological exam    - Noncontrast head CT 10/29/2020:  No acute intracranial abnormality    We have discussed concussions and the natural course of recovery  We have discussed that symptoms from a concussion typically take 2 weeks to resolve, and although sometimes it can feel like concussion symptoms linger on, at this point these symptoms would be related to  Posttraumatic headaches  - Safe driving precautions, she should not drive at all if feeling sleepy or cognitively not well          Headache Preventive:  - Discussed headache hygiene and lifestyle factors that may improve headaches   - Discussed some headache preventative supplements that could be considered as below - starting with magnesium  -  Future options: Venlafaxine, topiramate    Headache Abortive:  - Discussed not taking over-the-counter or prescription headache abortive more than 3 days per week to prevent medication overuse headache      Patient instructions:       Concussion is over  Post traumatic headaches should continue to improve with time, if not we have medication options  Headache/migraine treatment:   Abortive medications (for immediate treatment of a headache): Ok to take ibuprofen or acetaminophen for headaches, but try to limit the amount and frequency that you are taking to avoid medication overuse/rebound headache  Ideally no more than 3 days per week  Over the counter preventive supplements for headaches/migraines - try for 2-3 months at least   (to take every day to help prevent headaches - not to take at the time of headache):  - Magnesium 400mg daily  - Can occasionally cause stomach upset - if so try at night, with food or stop, rarely can cause diarrhea if so stop  Prescription preventive medications for headaches/migraines   (to take every day to help prevent headaches - not to take at the time of headache): Not indicated at this time     Lifestyle Recommendations:  - Maintain good sleep hygiene  Going to bed and waking up at consistent times, avoiding excessive daytime naps, avoiding caffeinated beverages in the evening, avoid excessive stimulation in the evening and generally using bed primarily for sleeping  One hour before bedtime would recommend turning lights down lower, decreasing your activity (may read quietly, listen to music at a low volume)  When you get into bed, should eliminate all technology (no texting, emailing, playing with your phone, iPad or tablet in bed)  - Maintain good hydration  Drink  2L of fluid a day (4 typical small water bottles)  - Maintain good nutrition  In particular don't skip meals and eat balanced meals regularly        Education and Follow-up  - Please contact us if any questions or concerns arise  Of course, try to protect yourself from head injuries, and if any new concerning symptoms or significant blow to the head or body go to the emergency department  - Follow up if needed           CC:   Georgina Deras is a  right handed female who presents for evaluation following a possible concussion  History obtained from patient as well as available medical record review  History of Present Illness:   Current medical illnesses or past medical history include  Depression, chronic low back pain, Iron deficiency anemia, vitamin-D deficiency,  Benign neoplasm of major salivary gland,  Dietary calcium deficiency (lactose intolerant), bilateral hearing loss per patient     Specifics: In early 10/2020, vasovagal syncope at urgent care after ketorolac shot for low back pain and she believes she hit her head - per the witnesses she hit her head on a chair, she thinks she may have hit her head on the floor  Acute symptoms included: syncope - last thing she remembers is saying it does burn, and the next thing she remembers is waking up on the floor, was confused, asking questions, was panicing a bit at the time  Head hurt, out of it the whole day  "felt like a violation the way the whole thing happened and the way she was treated"    Per PCP note 10/29/20: " She felt somewhat confused at that point  Since then she has been having headaches vision changes difficulty with focusing and memory issues  "  -  "CT scan of the head was negative  Holter monitor did show sinus rhythm with heart rate ranging between   No concerning arrhythmias were seen  Lab studies show vitamin-D deficiency anemia normocytic with hemoglobin of 10 8  "  - she subsequently has undergone iron infusions     Head hurt on the top of the head for a few weeks after     - as of 4/8/2021: all concussion symptoms have resolved   Occasionally has headaches  She thinks she has fibromyalgia, has chronic pain all over her body     Headaches  - headaches before this were less intense and would resolve with 2 tylenols  - have drastically improved, as of 4/8/2021 2 headaches last week, none this week  Typically 2-4 headaches a month  ES tyelenol doesn't always help  Headaches are all over, no specific spot, pressure  Has some photophobia and phonophobia at times  No nausea or vomiting  Mood:  Depression in the past in her 25s   meds made her feel like a zombie in the past - lithium, wellbutrin, risperidone   - lately still goes to therapy, mood pretty good  - trauma throughout life    Sleep 6-8 hours at night    The following portions of the patient's history were reviewed in the system and updated as appropriate: allergies, current medications, past family history, past medical history, past social history, past surgical history and problem list     Pertinent family history:  [] Migraines  [] Learning disability (ADHD, dyslexia)   [x] Psych disorder (depression, anxiety)    Pertinent social history:  Work: security office at [employer]  Education: [employer] with      Illicit Drugs: denies  Alcohol/tobacco: Denies alcohol use, Denies tobacco use    Past Medical History:       Past Medical History:   Diagnosis Date    Benign neoplasm parotid gland     LEFT SUPERFICIAL PAROTID GLAND, BENIGN    Chlamydia     19-ROUTINE SCREEN    Depression     NO MEDS- DR Basia Mcarthur    Gonorrhea     19- ROUTINE SCREEN    Simple ovarian cyst 10/2010    RIGHT, SIMPLE [id number] MM   STABLE THROUGH AT LEAST 9/13    Varicella      Patient Active Problem List   Diagnosis    Encntr for gyn exam (general) (routine) w abnormal findings    Dietary calcium deficiency    Screening breast examination    Abnormal mammogram    Breast mass    Encounter for surveillance of condom contraception    Exposure to sexually transmitted disease (STD)    Exposure to viral disease    Anemia       Medications:      Current Outpatient Medications   Medication Sig Dispense Refill    cholecalciferol (VITAMIN D3) 400 units tablet Take 400 Units by mouth daily      Multiple Vitamin (multivitamin) tablet Take 1 tablet by mouth daily      Na Sulfate-K Sulfate-Mg Sulf (Suprep Bowel Prep Kit) 17 5-3 13-1 6 GM/177ML SOLN Take 1 Bottle by mouth once for 1 dose 1 Bottle 0     No current facility-administered medications for this visit           Allergies:    No Known Allergies    Family History:        Family History   Problem Relation Age of Onset    Depression Mother     Hyperlipidemia Mother     Hypertension Mother     Coronary artery disease Maternal Grandmother     Cervical cancer Maternal Grandmother         IN 52'S    Diabetes type II Paternal Grandmother     Diabetes type II Other     No Known Problems Maternal Aunt     No Known Problems Maternal Aunt     No Known Problems Maternal Aunt     No Known Problems Paternal Aunt     No Known Problems Paternal Aunt     No Known Problems Paternal Aunt          Social History:       Social History     Socioeconomic History    Marital status: /Civil Union     Spouse name: Not on file    Number of children: Not on file    Years of education: Not on file    Highest education level: Not on file   Occupational History    Not on file   Social Needs    Financial resource strain: Not on file    Food insecurity     Worry: Not on file     Inability: Not on file   [employer] needs     Medical: Not on file     Non-medical: Not on file   Tobacco Use    Smoking status: Former Smoker     Packs/day: 1 00    Smokeless tobacco: Never Used    Tobacco comment: 19-25 yo    Substance and Sexual Activity    Alcohol use: No    Drug use: No    Sexual activity: Yes     Partners: Male     Birth control/protection: Condom Male   Lifestyle    Physical activity     Days per week: Not on file     Minutes per session: Not on file    Stress: Not on file   Relationships    Social connections Talks on phone: Not on file     Gets together: Not on file     Attends Jewish service: Not on file     Active member of club or organization: Not on file     Attends meetings of clubs or organizations: Not on file     Relationship status: Not on file    Intimate partner violence     Fear of current or ex partner: Not on file     Emotionally abused: Not on file     Physically abused: Not on file     Forced sexual activity: Not on file   Other Topics Concern    Not on file   Social History Narrative    Not on file       Objective:     Physical Exam:                                                                 Vitals:            Constitutional:    /58 (BP Location: Left arm, Patient Position: Sitting, Cuff Size: Standard)   Pulse 72   Ht 5' 5" (1 651 m)   Wt 73 9 kg (163 lb)   BMI 27 12 kg/m²   BP Readings from Last 3 Encounters:   04/08/21 110/58   01/27/21 114/76   01/20/21 129/72     Pulse Readings from Last 3 Encounters:   04/08/21 72   01/27/21 67   01/20/21 93         Well developed, well nourished, well groomed  No dysmorphic features  HEENT:  Normocephalic atraumatic  No meningismus  Oropharynx is clear and moist  No oral mucosal lesions  Chest:  Respirations regular and unlabored  Cardiovascular:  Regular rate, intact distal pulses  Distal extremities warm without palpable edema or tenderness, no observed significant swelling  Musculoskeletal:  Full range of motion  (see below under neurologic exam for evaluation of motor function and gait)   Skin:  warm and dry, not diaphoretic  No apparent birthmarks or stigmata of neurocutaneous disease  Psychiatric:  Normal behavior and appropriate affect, flat        Neurological Examination:     Mental status/cognitive function: Recent and remote memory intact  Attention span and concentration as well as fund of knowledge are appropriate for age  Normal language and spontaneous speech  Cranial Nerves:  II-visual fields full  Fundi poorly visualized due to pupillary constriction  III, IV, VI-Pupils were equal, round, and reactive to light and accomodation  Extraocular movements were full and conjugate without nystagmus  conjugate gaze, normal smooth pursuits, normal saccades   V-facial sensation symmetric  VII-facial expression symmetric, intact forehead wrinkle, strong eye closure, symmetric smile    VIII-hearing grossly intact bilaterally   IX, X-palate elevation symmetric, no dysarthria  XI-shoulder shrug strength intact    XII-tongue protrusion midline  Motor Exam: symmetric bulk and tone throughout, no pronator drift  Power/strength 5/5 bilateral upper and lower extremities, no atrophy, fasciculations or abnormal movements noted  Sensory: grossly intact light touch in all extremities  Reflexes: brachioradialis 2+, biceps 2+, knee 2+, ankle 2+ bilaterally  No ankle clonus  (at times aspects of functional neurologic response when tapping on knees)  Coordination: Finger nose finger intact bilaterally, no apparent dysmetria, ataxia or tremor noted  Gait: steady casual and tandem gait  Pertinent lab results:    12/02/2020 low iron, B12 460  11/21/2020 CMP unremarkable, CBC with hemoglobin 10 8   normal TSH   Vitamin-D 24       Imaging:   I have personally reviewed imaging and radiology read   - Noncontrast head CT 10/29/2020:  No acute intracranial abnormality       Review of Systems:   ROS obtained by medical assistant Personally reviewed and updated if indicated  Review of Systems   Constitutional: Negative  Negative for appetite change and fever  HENT: Negative  Negative for hearing loss, tinnitus, trouble swallowing and voice change  Eyes: Negative  Negative for photophobia and pain  Respiratory: Negative  Negative for shortness of breath  Cardiovascular: Negative  Negative for palpitations  Gastrointestinal: Negative  Negative for nausea and vomiting  Endocrine: Negative   Negative for cold intolerance  Genitourinary: Negative  Negative for dysuria, frequency and urgency  Musculoskeletal: Positive for back pain  Negative for myalgias and neck pain  Skin: Negative  Negative for rash  Neurological: Negative  Negative for dizziness, tremors, seizures, syncope, facial asymmetry, speech difficulty, weakness, light-headedness, numbness and headaches  Hematological: Negative  Does not bruise/bleed easily  Psychiatric/Behavioral: Negative  Negative for confusion, hallucinations and sleep disturbance  I have spent 45 minutes with Patient  today in which greater than 50% of this time was spent in counseling/coordination of care regarding Diagnostic results, Prognosis, Risks and benefits of tx options, Intructions for management, Patient education and Impressions  I also spent 20 minutes non face to face for this patient the same day         Author:  Vincent Pastor MD   Fellowship trained Concussion Specialist

## 2021-04-15 ENCOUNTER — ANNUAL EXAM (OUTPATIENT)
Dept: OBGYN CLINIC | Facility: CLINIC | Age: 46
End: 2021-04-15
Payer: COMMERCIAL

## 2021-04-15 ENCOUNTER — TELEPHONE (OUTPATIENT)
Dept: OBGYN CLINIC | Facility: CLINIC | Age: 46
End: 2021-04-15

## 2021-04-15 VITALS
SYSTOLIC BLOOD PRESSURE: 120 MMHG | OXYGEN SATURATION: 98 % | BODY MASS INDEX: 27.16 KG/M2 | WEIGHT: 163 LBS | DIASTOLIC BLOOD PRESSURE: 76 MMHG | HEART RATE: 67 BPM | HEIGHT: 65 IN

## 2021-04-15 DIAGNOSIS — N92.0 MENORRHAGIA WITH REGULAR CYCLE: ICD-10-CM

## 2021-04-15 DIAGNOSIS — Z01.419 ENCOUNTER FOR ANNUAL ROUTINE GYNECOLOGICAL EXAMINATION: Primary | ICD-10-CM

## 2021-04-15 DIAGNOSIS — E58 DIETARY CALCIUM DEFICIENCY: ICD-10-CM

## 2021-04-15 DIAGNOSIS — Z12.31 VISIT FOR SCREENING MAMMOGRAM: ICD-10-CM

## 2021-04-15 DIAGNOSIS — Z12.4 PAP SMEAR FOR CERVICAL CANCER SCREENING: ICD-10-CM

## 2021-04-15 DIAGNOSIS — Z72.3 INADEQUATE EXERCISE: ICD-10-CM

## 2021-04-15 PROCEDURE — 3008F BODY MASS INDEX DOCD: CPT | Performed by: PSYCHIATRY & NEUROLOGY

## 2021-04-15 PROCEDURE — S0610 ANNUAL GYNECOLOGICAL EXAMINA: HCPCS | Performed by: OBSTETRICS & GYNECOLOGY

## 2021-04-15 NOTE — PROGRESS NOTES
Pt is a 39 y o  Beatris Krista with Patient's last menstrual period was 2021 (exact date)  using condoms (male) for [facility] presents for preventive care  She notes the same partner since her last STI evaluation  In her lifetime she has been involved with <5 partners   Safe sexual practices (monogomy, condoms) are followed consistently  · She does  feel safe in the relationship  She does feel safe in her home  · Her calcium intake encompasses cheese and yogurt for a total of 1 servings daily on average  She does take additional Vitamin D (MVI or supplement)  · She exercises 2-3 times per week  · Her menses occur every 22-26  Days, last 7-10 days and require overnight pad every 3-5 hours  Menstrual History:  OB History        0    Para   0    Term   0       0    AB   0    Living   0       SAB   0    TAB   0    Ectopic   0    Multiple   0    Live Births   0           Obstetric Comments   Menarche: 14    Menses: -/7-10/overnight pad every 2-3 hours for 3 days  Spotting 2-3 days and then 5-7 days of flow with 3 days of heavy flow            Menarche age: 17  Patient's last menstrual period was 2021 (exact date)  ·      · She has never recieved an HPV vaccine  · tobacco use : does not use tobacco              · Colonoscopy: not indicated  · Mammogram: 3/3/2021-3D wnl, repeat 1 year  · Pap: 5/10/2016-wnl, HRHPV neg; repeat collected today  Pt advised that as she is menstruating, there may be too much blood to read and if that is the case, will repeat at a later date  She is agreeable  · Pt reports that her PCP notified her that she is anemic  Pt believes it to be due to her periods which are every 22-26 days and are very heavy for 3 days   She reports 2-3 days of spotting followed by 3 days of heavy flow, followed by moderate flow for 1-2 days and then spotting again for a total of 7-10 days of bleeding each month    Past Medical History:   Diagnosis Date    Benign neoplasm parotid gland     LEFT SUPERFICIAL PAROTID GLAND, BENIGN    Chlamydia     19-ROUTINE SCREEN    Depression     NO MEDS- DR Basia Mcarthur    Gonorrhea     19- ROUTINE SCREEN    Simple ovarian cyst 10/2010    RIGHT, SIMPLE [id number] MM  STABLE THROUGH AT LEAST     Varicella        Past Surgical History:   Procedure Laterality Date    BREAST BIOPSY      : RIGHT BREAST, BENIGN  DR Deidre Amaro; 5/15: LEFT BREAST, BENIGN  HEMATOMA P-OP 9/15; 11/15: LEFT BENIGN CYST AND WALL; 9/15: LEFT AS REACCUMULATED AND NOW COMPOUND CYSTIC STRUCTURE    BREAST BIOPSY      : MULTIPLE, BENIGN DR VILLALTA; 5/15: LEFT BENIGN    BREAST LUMPECTOMY      L breast x 1 and x 1 on right    KNEE SURGERY Left     REPAIR ANKLE LIGAMENT      I ANKLE BONE SPUR    SALIVARY GLAND SURGERY Left 2012    EXCISION OF PARATID TUMOR/GLAND; PAROTID (SUPERFICIAL), BENIGN    SHOULDER SURGERY Right     WISDOM TOOTH EXTRACTION         OB History    Para Term  AB Living   0 0 0 0 0 0   SAB TAB Ectopic Multiple Live Births   0 0 0 0 0   Obstetric Comments   Menarche: 14      Menses: 22-26/7-10/overnight pad every 2-3 hours for 3 days   Spotting 2-3 days and then 5-7 days of flow with 3 days of heavy flow              Current Outpatient Medications:     cholecalciferol (VITAMIN D3) 400 units tablet, Take 400 Units by mouth daily, Disp: , Rfl:     Multiple Vitamin (multivitamin) tablet, Take 1 tablet by mouth daily, Disp: , Rfl:     No Known Allergies    Social History     Socioeconomic History    Marital status: /Civil Union     Spouse name: Luiz Donohue Number of children: 0    Years of education: None    Highest education level: High school graduate   Occupational History    Occupation: Security   Social Needs    Financial resource strain: None    Food insecurity     Worry: None     Inability: None    Transportation needs     Medical: None     Non-medical: None   Tobacco Use    Smoking status: Former Smoker Packs/day: 1 00     Types: Cigarettes    Smokeless tobacco: Never Used    Tobacco comment: [age] yo    Substance and Sexual Activity    Alcohol use: No    Drug use: No    Sexual activity: Yes     Partners: Male     Birth control/protection: Condom Male     Comment: lifetime partners: 4;  2005   Lifestyle    Physical activity     Days per week: None     Minutes per session: None    Stress: None   Relationships    Social connections     Talks on phone: None     Gets together: None     Attends Oriental orthodox service: None     Active member of club or organization: None     Attends meetings of clubs or organizations: None     Relationship status: None    Intimate partner violence     Fear of current or ex partner: None     Emotionally abused: None     Physically abused: None     Forced sexual activity: None   Other Topics Concern    None   Social History Narrative    Adventist: no preference    Accepts blood products                Exercise: PT 3x/week    Calcium: 1 yogurt 2-3x/week, 1 cheese 4-5x/week, multivitamin daily       Family History   Problem Relation Age of Onset    Depression Mother     Hyperlipidemia Mother     Hypertension Mother     Coronary artery disease Maternal Grandmother     Cervical cancer Maternal Grandmother         IN 50'S    Diabetes type II Maternal Grandmother     Diabetes type II Other     No Known Problems Father         estranged    No Known Problems Sister         estranged    No Known Problems Maternal Grandfather         unsure    No Known Problems Maternal Aunt     No Known Problems Maternal Aunt     No Known Problems Maternal Aunt     No Known Problems Paternal Aunt     No Known Problems Paternal Aunt     No Known Problems Paternal Aunt     No Known Problems Brother         estranged    Breast cancer Neg Hx     Ovarian cancer Neg Hx     Colon cancer Neg Hx        Blood pressure 120/76, pulse 67, height 5' 5" (1 651 m), weight 73 9 kg (163 lb), last menstrual period 04/14/2021, SpO2 98 %, not currently breastfeeding  and Body mass index is 27 12 kg/m²  Physical Exam  Constitutional:       General: She is not in acute distress  Appearance: Normal appearance  She is well-developed  She is not ill-appearing  HENT:      Head: Normocephalic and atraumatic  Eyes:      Extraocular Movements: Extraocular movements intact  Conjunctiva/sclera: Conjunctivae normal    Neck:      Musculoskeletal: Normal range of motion and neck supple  Thyroid: No thyromegaly  Trachea: No tracheal deviation  Cardiovascular:      Rate and Rhythm: Normal rate and regular rhythm  Heart sounds: Normal heart sounds  Pulmonary:      Effort: Pulmonary effort is normal  No respiratory distress  Breath sounds: Normal breath sounds  No stridor  No wheezing or rales  Abdominal:      General: Bowel sounds are normal  There is no distension  Palpations: Abdomen is soft  Tenderness: There is no abdominal tenderness  There is no guarding or rebound  Hernia: No hernia is present  Musculoskeletal: Normal range of motion  General: No tenderness  Lymphadenopathy:      Cervical: No cervical adenopathy  Skin:     General: Skin is warm  Findings: No erythema or rash  Neurological:      Mental Status: She is alert and oriented to person, place, and time  Psychiatric:         Mood and Affect: Mood normal          Behavior: Behavior normal          Thought Content: Thought content normal          Judgment: Judgment normal          Breasts: breasts appear normal, no suspicious masses, no skin or nipple changes or axillary nodes, symmetric fibrous changes in both upper outer quadrants   Multiple cystic structures noted within breast--pt reports this has been present for years     vulva: normal external genitalia for age and no lesions, masses, epithelial changes, or exudate  vagina: color pink, rugae  well formed rugae and bleeding  with a moderate amount of bleeding  cervix: nullip, no lesions  and pap obtained  uterus: NSSC, AF, NT, mobile  adnexa: no masses or tenderness      A/P:  Pt is a 39 y o  Inna Fischer with      Diagnoses and all orders for this visit:    Encounter for annual routine gynecological examination    Pap smear for cervical cancer screening  -     Thinprep Tis Pap and HPV mRNA E6/E7 Reflex HPV 16,18/45    Visit for screening mammogram  -     Mammo screening bilateral w 3d & cad; Future    Menorrhagia with regular cycle  -     Prolactin; Future  -     CBC; Future  -     US pelvis complete w transvaginal; Future  -     Prolactin  -     CBC  -will follow up to discuss results and next steps in management  Dietary calcium deficiency  Patient advised recommendation of daily dietary calcium of  1000 mg calcium  Inadequate exercise  Patient advised recommendation of exercise 5 times per week for 30 minutes  BMI 27 0-27 9,adult  Patient advised recommendation of BMI to be between 19-25

## 2021-04-20 LAB
CLINICAL INFO: NORMAL
CYTO CVX: NORMAL
DATE PREVIOUS BX: NORMAL
HPV E6+E7 MRNA CVX QL NAA+PROBE: NOT DETECTED
LMP START DATE: NORMAL
SL AMB PREV. PAP:: NORMAL
SPECIMEN SOURCE CVX/VAG CYTO: NORMAL

## 2021-07-28 DIAGNOSIS — R05.8 COUGH WITH EXPOSURE TO COVID-19 VIRUS: Primary | ICD-10-CM

## 2021-07-28 DIAGNOSIS — Z20.822 COUGH WITH EXPOSURE TO COVID-19 VIRUS: Primary | ICD-10-CM

## 2021-07-28 PROCEDURE — U0005 INFEC AGEN DETEC AMPLI PROBE: HCPCS | Performed by: INTERNAL MEDICINE

## 2021-07-28 PROCEDURE — U0003 INFECTIOUS AGENT DETECTION BY NUCLEIC ACID (DNA OR RNA); SEVERE ACUTE RESPIRATORY SYNDROME CORONAVIRUS 2 (SARS-COV-2) (CORONAVIRUS DISEASE [COVID-19]), AMPLIFIED PROBE TECHNIQUE, MAKING USE OF HIGH THROUGHPUT TECHNOLOGIES AS DESCRIBED BY CMS-2020-01-R: HCPCS | Performed by: INTERNAL MEDICINE

## 2021-08-29 ENCOUNTER — HOSPITAL ENCOUNTER (EMERGENCY)
Facility: HOSPITAL | Age: 46
Discharge: HOME | End: 2021-08-30
Attending: EMERGENCY MEDICINE | Admitting: EMERGENCY MEDICINE
Payer: COMMERCIAL

## 2021-08-29 VITALS
HEART RATE: 73 BPM | DIASTOLIC BLOOD PRESSURE: 82 MMHG | RESPIRATION RATE: 16 BRPM | BODY MASS INDEX: 27.59 KG/M2 | TEMPERATURE: 98.2 F | SYSTOLIC BLOOD PRESSURE: 161 MMHG | WEIGHT: 165.79 LBS | OXYGEN SATURATION: 100 %

## 2021-08-29 DIAGNOSIS — N90.89 LABIAL LESION: Primary | ICD-10-CM

## 2021-08-29 PROCEDURE — 99283 EMERGENCY DEPT VISIT LOW MDM: CPT

## 2021-08-30 PROCEDURE — 99283 EMERGENCY DEPT VISIT LOW MDM: CPT | Performed by: EMERGENCY MEDICINE

## 2021-08-30 NOTE — ED PROVIDER NOTES
History  Chief Complaint   Patient presents with    Vaginal Pain     pt reports a "sore" near vagina that is brown/black in color and is painful  38y F here for evaluation of a small ?brown/black area on the left labia  Noted tonight in the shower - felt a small bump that wasn't there when she showered yesterday  Tried looking at it after the shower and had trouble - had  look and he said it is a small brownish/black lump ? flush with the skin - very small  Pt here b/c was out gardening today and they want to make sure that it isn't a tick  Denies any drainage  Has chronic right sided bartholins cyst that is unchanged  No other co      History provided by:  Patient and relative   used: No    Vaginal Pain  Location:  Small bump on left outer labia  Quality:  No sig ttp  Onset quality:  Sudden  Timing:  Constant  Progression:  Unchanged  Chronicity:  New  Context:  See above  Relieved by:  N/a  Worsened by:  N/a  Ineffective treatments:  N/a  Associated symptoms: no fever        Prior to Admission Medications   Prescriptions Last Dose Informant Patient Reported? Taking? Multiple Vitamin (multivitamin) tablet  Self Yes Yes   Sig: Take 1 tablet by mouth daily   cholecalciferol (VITAMIN D3) 400 units tablet  Self Yes Yes   Sig: Take 400 Units by mouth daily      Facility-Administered Medications: None       Past Medical History:   Diagnosis Date    Benign neoplasm parotid gland     LEFT SUPERFICIAL PAROTID GLAND, BENIGN    Chlamydia     19-ROUTINE SCREEN    Depression     NO MEDS- DR Delcid Quiet    Gonorrhea     19- ROUTINE SCREEN    Simple ovarian cyst 10/2010    RIGHT, SIMPLE [id number] MM  STABLE THROUGH AT LEAST 9/13    Varicella        Past Surgical History:   Procedure Laterality Date    BREAST BIOPSY      2011: RIGHT BREAST, BENIGN  DR Neisha Moreno; 5/15: LEFT BREAST, BENIGN   HEMATOMA P-OP 9/15; 11/15: LEFT BENIGN CYST AND WALL; 9/15: LEFT AS REACCUMULATED AND NOW COMPOUND CYSTIC STRUCTURE    BREAST BIOPSY      2003/04: MULTIPLE, BENIGN DR VILLALTA; 5/15: LEFT BENIGN    BREAST LUMPECTOMY      L breast x 1 and x 1 on right    KNEE SURGERY Left     REPAIR ANKLE LIGAMENT      I ANKLE BONE SPUR    SALIVARY GLAND SURGERY Left 2012    EXCISION OF PARATID TUMOR/GLAND; PAROTID (SUPERFICIAL), BENIGN    SHOULDER SURGERY Right     WISDOM TOOTH EXTRACTION         Family History   Problem Relation Age of Onset    Depression Mother     Hyperlipidemia Mother     Hypertension Mother     Coronary artery disease Maternal Grandmother     Cervical cancer Maternal Grandmother         IN 52'S    Diabetes type II Maternal Grandmother     Diabetes type II Other     No Known Problems Father         estranged    No Known Problems Sister         estranged    No Known Problems Maternal Grandfather         unsure    No Known Problems Maternal Aunt     No Known Problems Maternal Aunt     No Known Problems Maternal Aunt     No Known Problems Paternal Aunt     No Known Problems Paternal Aunt     No Known Problems Paternal Aunt     No Known Problems Brother         estranged    Breast cancer Neg Hx     Ovarian cancer Neg Hx     Colon cancer Neg Hx      I have reviewed and agree with the history as documented  E-Cigarette/Vaping    E-Cigarette Use Never User      E-Cigarette/Vaping Substances    Nicotine No     THC No     CBD No     Flavoring No      Social History     Tobacco Use    Smoking status: Former Smoker     Packs/day: 1 00     Types: Cigarettes    Smokeless tobacco: Never Used    Tobacco comment: 19-27 yo    Vaping Use    Vaping Use: Never used   Substance Use Topics    Alcohol use: No    Drug use: No       Review of Systems   Constitutional: Negative for chills and fever  Genitourinary: Positive for vaginal pain  Negative for dysuria, frequency, genital sores, hematuria, pelvic pain, urgency and vaginal discharge     All other systems reviewed and are negative  Physical Exam  Physical Exam  Vitals and nursing note reviewed  Eyes:      Pupils: Pupils are equal, round, and reactive to light  Cardiovascular:      Rate and Rhythm: Normal rate  Pulmonary:      Effort: Pulmonary effort is normal    Abdominal:      General: Abdomen is flat  Genitourinary:     Exam position: Supine  Pubic Area: No rash  Labia:         Left: Lesion present  Musculoskeletal:         General: No swelling  Cervical back: Normal range of motion  Skin:     General: Skin is warm  Neurological:      General: No focal deficit present  Mental Status: She is alert  Psychiatric:         Mood and Affect: Mood normal          Vital Signs  ED Triage Vitals [08/29/21 2245]   Temperature Pulse Respirations Blood Pressure SpO2   98 2 °F (36 8 °C) 73 16 161/82 100 %      Temp Source Heart Rate Source Patient Position - Orthostatic VS BP Location FiO2 (%)   Oral Monitor Sitting Right arm --      Pain Score       --           Vitals:    08/29/21 2245   BP: 161/82   Pulse: 73   Patient Position - Orthostatic VS: Sitting         Visual Acuity      ED Medications  Medications - No data to display    Diagnostic Studies  Results Reviewed     None                 No orders to display              Procedures  Procedures         ED Course                                           MDM  Number of Diagnoses or Management Options  Labial lesion: new and does not require workup  Diagnosis management comments: ?small traumatic blood blister vs ingrown hair/pimple  Discussed conservative treatment - just monitoring for now  If no improvement in 1-2 wks f/u w/ gyn  If larger or becomes more painful, rted    agreeable       Amount and/or Complexity of Data Reviewed  Obtain history from someone other than the patient: yes        Disposition  Final diagnoses:   Labial lesion     Time reflects when diagnosis was documented in both MDM as applicable and the Disposition within this note     Time User Action Codes Description Comment    8/30/2021  1:05 AM Edel Cohn Add [N90 89] Labial lesion       ED Disposition     ED Disposition Condition Date/Time Comment    Discharge Stable Mon Aug 30, 2021  1:04 AM Jass Boucher discharge to home/self care  Follow-up Information     Follow up With Specialties Details Why Contact Info Additional [address] [facility],[address] [facility] Schedule an appointment as soon as possible for a visit  If symptoms worsen or if no improvement [address] [phone]  [facility], [address]  [place], [place], [address] [phone]          Patient's Medications   Discharge Prescriptions    No medications on file     No discharge procedures on file      PDMP Review       Value Time User    PDMP Reviewed  Yes 10/27/2020  9:27 PM Leafy Fleischer, MD          ED Provider  Electronically Signed by           Rafi Lee DO  08/30/21 5300

## 2021-08-30 NOTE — DISCHARGE INSTRUCTIONS
The area appears to be a small blood blister  While gardening today you may have hit/bumped the area without realizing it resulting in a small traumatic blood blisters  This may also be a small pimple or ingrown hair  Do not attempt to pop or drain this  It may resolve on it's own  Follow up with your gynecologist for any worsening pain, increasing size, persistent of the area after 1-2 weeks or for any concern  Return to the Emergency Department of any significant worsening pain, redness, fever more than 101 or for any concerns

## 2022-07-26 ENCOUNTER — APPOINTMENT (OUTPATIENT)
Dept: LAB | Facility: CLINIC | Age: 47
End: 2022-07-26
Payer: COMMERCIAL

## 2022-07-26 ENCOUNTER — ANNUAL EXAM (OUTPATIENT)
Dept: OBGYN CLINIC | Facility: CLINIC | Age: 47
End: 2022-07-26
Payer: COMMERCIAL

## 2022-07-26 VITALS
HEIGHT: 65 IN | BODY MASS INDEX: 28.39 KG/M2 | SYSTOLIC BLOOD PRESSURE: 98 MMHG | DIASTOLIC BLOOD PRESSURE: 70 MMHG | WEIGHT: 170.4 LBS

## 2022-07-26 DIAGNOSIS — N92.0 MENORRHAGIA WITH REGULAR CYCLE: ICD-10-CM

## 2022-07-26 DIAGNOSIS — Z72.3 INADEQUATE EXERCISE: ICD-10-CM

## 2022-07-26 DIAGNOSIS — N64.89 BREAST EDEMA: ICD-10-CM

## 2022-07-26 DIAGNOSIS — E58 DIETARY CALCIUM DEFICIENCY: ICD-10-CM

## 2022-07-26 DIAGNOSIS — Z12.31 VISIT FOR SCREENING MAMMOGRAM: ICD-10-CM

## 2022-07-26 DIAGNOSIS — Z01.419 ENCOUNTER FOR ANNUAL ROUTINE GYNECOLOGICAL EXAMINATION: Primary | ICD-10-CM

## 2022-07-26 PROBLEM — N75.0 CYST OF RIGHT BARTHOLIN'S GLAND DUCT: Status: ACTIVE | Noted: 2022-07-26

## 2022-07-26 LAB
ERYTHROCYTE [DISTWIDTH] IN BLOOD BY AUTOMATED COUNT: 17.3 % (ref 11.6–15.1)
HCT VFR BLD AUTO: 31.3 % (ref 34.8–46.1)
HGB BLD-MCNC: 9.4 G/DL (ref 11.5–15.4)
MCH RBC QN AUTO: 22.9 PG (ref 26.8–34.3)
MCHC RBC AUTO-ENTMCNC: 30 G/DL (ref 31.4–37.4)
MCV RBC AUTO: 76 FL (ref 82–98)
PLATELET # BLD AUTO: 419 THOUSANDS/UL (ref 149–390)
PMV BLD AUTO: 10.5 FL (ref 8.9–12.7)
PROLACTIN SERPL-MCNC: 7.5 NG/ML
RBC # BLD AUTO: 4.1 MILLION/UL (ref 3.81–5.12)
TSH SERPL DL<=0.05 MIU/L-ACNC: 1.93 UIU/ML (ref 0.45–4.5)
WBC # BLD AUTO: 6.65 THOUSAND/UL (ref 4.31–10.16)

## 2022-07-26 PROCEDURE — S0612 ANNUAL GYNECOLOGICAL EXAMINA: HCPCS | Performed by: OBSTETRICS & GYNECOLOGY

## 2022-07-26 PROCEDURE — 36415 COLL VENOUS BLD VENIPUNCTURE: CPT

## 2022-07-26 PROCEDURE — 85027 COMPLETE CBC AUTOMATED: CPT

## 2022-07-26 PROCEDURE — 84146 ASSAY OF PROLACTIN: CPT

## 2022-07-26 PROCEDURE — 84443 ASSAY THYROID STIM HORMONE: CPT

## 2022-07-26 NOTE — PROGRESS NOTES
Pt is a 42 y o  Mars Marisa with Patient's last menstrual period was 2022  using condoms (male) for [facility] presents for preventive care  She notes the same partner since her last STI evaluation  In her lifetime she has been involved with <5 partners   Safe sexual practices (monogomy, condoms) are followed consistently  · She does  feel safe in the relationship  She does feel safe in her home  · Her calcium intake encompasses cheese and yogurt for a total of 2 servings daily on average  She does not take additional Vitamin D (MVI or supplement)  · She exercises daily--farm work times per week  · Her menses occur every 22-26 Days, last 7-8 days and require overnight pad every 1-2 hours  Menstrual History:  OB History        0    Para   0    Term   0       0    AB   0    Living   0       SAB   0    IAB   0    Ectopic   0    Multiple   0    Live Births   0           Obstetric Comments   Menarche: 14    Menses: -/7-8/spotting for 2-3 days then overnight pad every 1-2 hours for 4 days              Menarche age: 17  Patient's last menstrual period was 2022  ·      · She has never recieved an HPV vaccine  · tobacco use : does not use tobacco              · Colonoscopy: not indicated  · Pap: 2021-wnl, HRHPV neg, repeat   · Mammogram: 3/3/2021-wnl, repeat rx given  · Pt did not have blood work or u/s as ordered last year for menorrhagia  She reports she will go this year  Recommend Ebx to r o hyperplasia or malignancy--pt agreeable and will schedule  Reviewed management options including ocps, ortho evra, nuvaring, mirena, endometrial ablation and TXA  Pt undecided regarding management and will review pamphlets and decide at time of Ebx  All questions answered       Past Medical History:   Diagnosis Date    Benign neoplasm parotid gland     LEFT SUPERFICIAL PAROTID GLAND, BENIGN    Chlamydia     19-ROUTINE SCREEN    Depression     NO MEDS- DR Doc Swan Gonorrhea 23- ROUTINE SCREEN    Pap smear for cervical cancer screening     2016-wnl, HRHPV neg; 2021-wnl, HRHPV neg    Simple ovarian cyst 10/2010    RIGHT, SIMPLE [id number] MM  STABLE THROUGH AT LEAST     Varicella        Past Surgical History:   Procedure Laterality Date    BREAST BIOPSY      : RIGHT BREAST, BENIGN  DR Bertha Murillo; 5/15: LEFT BREAST, BENIGN  HEMATOMA P-OP 9/15; 11/15: LEFT BENIGN CYST AND WALL; 9/15: LEFT AS REACCUMULATED AND NOW COMPOUND CYSTIC STRUCTURE    BREAST BIOPSY      : MULTIPLE, BENIGN DR VILLALTA; 5/15: LEFT BENIGN    BREAST LUMPECTOMY      L breast x 1 and x 1 on right    KNEE SURGERY Right     REPAIR ANKLE LIGAMENT Left     I ANKLE BONE SPUR    SALIVARY GLAND SURGERY Left 2012    EXCISION OF PARATID TUMOR/GLAND; PAROTID (SUPERFICIAL), BENIGN    SHOULDER SURGERY Right     WISDOM TOOTH EXTRACTION         OB History    Para Term  AB Living   0 0 0 0 0 0   SAB IAB Ectopic Multiple Live Births   0 0 0 0 0   Obstetric Comments   Menarche: 14      Menses: 22-26/7-8/spotting for 2-3 days then overnight pad every 1-2 hours for 4 days            Current Outpatient Medications:     cholecalciferol (VITAMIN D3) 400 units tablet, Take 400 Units by mouth daily, Disp: , Rfl:     Multiple Vitamin (multivitamin) tablet, Take 1 tablet by mouth daily, Disp: , Rfl:     No Known Allergies    Social History     Socioeconomic History    Marital status: /Civil Union     Spouse name: Bonilla Croft Number of children: 0    Years of education: None    Highest education level: High school graduate   Occupational History    Occupation: Security   Tobacco Use    Smoking status: Former Smoker     Packs/day: 1 00     Types: Cigarettes    Smokeless tobacco: Never Used    Tobacco comment: [age] yo    Vaping Use    Vaping Use: Never used   Substance and Sexual Activity    Alcohol use: No    Drug use: No    Sexual activity: Yes     Partners: Male     Birth control/protection: Condom Male     Comment: lifetime partners: 4;  2005   Other Topics Concern    None   Social History Narrative    Orthodoxy: no preference    Accepts blood products                Exercise: Farm work daily    Calcium: 1 yogurt everyday, 1 cheese everyday     Social Determinants of Health     Financial Resource Strain: Not on file   Food Insecurity: Not on file   Transportation Needs: Not on file   Physical Activity: Not on file   Stress: Not on file   Social Connections: Not on file   Intimate Partner Violence: Not on file   Housing Stability: Not on file       Family History   Problem Relation Age of Onset    Depression Mother     Hyperlipidemia Mother     Hypertension Mother     No Known Problems Father         estranged    No Known Problems Sister         estranged    No Known Problems Brother         estranged    Coronary artery disease Maternal Grandmother     Cervical cancer Maternal Grandmother         IN 50'S    Diabetes type II Maternal Grandmother     Heart failure Maternal Grandmother     No Known Problems Maternal Grandfather         unsure    No Known Problems Maternal Aunt     No Known Problems Maternal Aunt     No Known Problems Maternal Aunt     No Known Problems Paternal Aunt     No Known Problems Paternal Aunt     No Known Problems Paternal Aunt     Diabetes type II Other     Breast cancer Neg Hx     Ovarian cancer Neg Hx     Colon cancer Neg Hx        Blood pressure 98/70, height 5' 4 5" (1 638 m), weight 77 3 kg (170 lb 6 4 oz), last menstrual period 07/24/2022, not currently breastfeeding  and Body mass index is 28 8 kg/m²  Physical Exam  Constitutional:       General: She is not in acute distress  Appearance: Normal appearance  She is well-developed  She is not ill-appearing  HENT:      Head: Normocephalic and atraumatic  Eyes:      Extraocular Movements: Extraocular movements intact        Conjunctiva/sclera: Conjunctivae normal  Neck:      Thyroid: No thyromegaly  Trachea: No tracheal deviation  Cardiovascular:      Rate and Rhythm: Normal rate and regular rhythm  Heart sounds: Normal heart sounds  Pulmonary:      Effort: Pulmonary effort is normal  No respiratory distress  Breath sounds: Normal breath sounds  No stridor  No wheezing or rales  Abdominal:      General: Bowel sounds are normal  There is no distension  Palpations: Abdomen is soft  There is no mass  Tenderness: There is no abdominal tenderness  There is no guarding or rebound  Hernia: No hernia is present  Musculoskeletal:         General: No tenderness  Normal range of motion  Cervical back: Normal range of motion and neck supple  Lymphadenopathy:      Cervical: No cervical adenopathy  Skin:     General: Skin is warm  Findings: No erythema or rash  Neurological:      Mental Status: She is alert and oriented to person, place, and time  Psychiatric:         Mood and Affect: Mood normal          Behavior: Behavior normal          Thought Content: Thought content normal          Judgment: Judgment normal          Breasts: symmetric fibrous changes in both upper outer quadrants, right breast normal without mass, skin or nipple changes or axillary nodes, left breast normal without mass, nipple changes or axillary nodes  Thickening of skin noted around areola, edematous  vulva: normal external genitalia for age and right bartholin's cyst--4-5 cm noted   pt declines intervention reporting it is chronic  vagina: color pink, rugae  well formed rugae and bleeding  with a small amount of bleeding  cervix: nullip and no lesions   uterus: NSSC, AF, NT, mobile  adnexa: no masses or tenderness      A/P:  Pt is a 42 y o   with      Everton Mellisa was seen today for gynecologic exam     Diagnoses and all orders for this visit:    Encounter for annual routine gynecological examination  -pap up to date    Visit for screening mammogram  - Mammo screening bilateral w 3d & cad; Future    Menorrhagia with regular cycle  -     CBC; Future  -     Prolactin; Future  -     TSH, 3rd generation with Free T4 reflex; Future  -pt to return for ebx  -decided between options for treatment--declines Estrogen containing medications--considering TXA, Mirena, Ablation--pamphlets given    Breast edema  -isolated in skin of left breast, pt unsure of trauma, will observe and call if not resolving    Dietary calcium deficiency  Patient advised recommendation of daily dietary calcium of 1000 mg calcium  Inadequate exercise  Patient advised recommendation of exercise 5 times per week for 30 minutes      BMI 28 0-28 9,adult  Patient advised recommendation of BMI to be between 19-25

## 2022-08-03 ENCOUNTER — TELEPHONE (OUTPATIENT)
Dept: OBGYN CLINIC | Facility: CLINIC | Age: 47
End: 2022-08-03

## 2022-08-03 NOTE — TELEPHONE ENCOUNTER
I called the patient to advise her that I have an opening tomorrow for an endometrial biopsy but she did not answer  LMOM to call office back  If slot still available, please offer to patient if she calls back

## 2022-08-09 ENCOUNTER — OFFICE VISIT (OUTPATIENT)
Dept: URGENT CARE | Age: 47
End: 2022-08-09
Payer: COMMERCIAL

## 2022-08-09 VITALS
SYSTOLIC BLOOD PRESSURE: 122 MMHG | DIASTOLIC BLOOD PRESSURE: 68 MMHG | TEMPERATURE: 98.5 F | RESPIRATION RATE: 18 BRPM | OXYGEN SATURATION: 99 % | HEART RATE: 77 BPM

## 2022-08-09 DIAGNOSIS — M54.50 ACUTE BILATERAL LOW BACK PAIN WITHOUT SCIATICA: Primary | ICD-10-CM

## 2022-08-09 PROCEDURE — 99213 OFFICE O/P EST LOW 20 MIN: CPT | Performed by: PHYSICIAN ASSISTANT

## 2022-08-09 RX ORDER — IBUPROFEN 800 MG/1
800 TABLET ORAL EVERY 8 HOURS PRN
Qty: 30 TABLET | Refills: 0 | Status: SHIPPED | OUTPATIENT
Start: 2022-08-09 | End: 2022-10-21

## 2022-08-09 RX ORDER — KETOROLAC TROMETHAMINE 30 MG/ML
30 INJECTION, SOLUTION INTRAMUSCULAR; INTRAVENOUS ONCE
Status: COMPLETED | OUTPATIENT
Start: 2022-08-09 | End: 2022-08-09

## 2022-08-09 RX ORDER — CYCLOBENZAPRINE HCL 10 MG
10 TABLET ORAL 3 TIMES DAILY PRN
Qty: 15 TABLET | Refills: 0 | Status: SHIPPED | OUTPATIENT
Start: 2022-08-09 | End: 2022-10-21

## 2022-08-09 RX ADMIN — KETOROLAC TROMETHAMINE 30 MG: 30 INJECTION, SOLUTION INTRAMUSCULAR; INTRAVENOUS at 14:19

## 2022-08-09 NOTE — LETTER
August 9, 2022     Patient: Abhilash Ramirez   YOB: 1975   Date of Visit: 8/9/2022       To Whom It May Concern: It is my medical opinion that Abhilash Ramirez should remain out of work until 08/11/2022  Patient is injured in unable to perform her current work duties       If you have any questions or concerns, please don't hesitate to call           Sincerely,        Moiz Ferguson PA-C    CC: No Recipients

## 2022-08-09 NOTE — PROGRESS NOTES
3300 5o9 Now        NAME: Mario Wei is a 42 y o  female  : 1975    MRN: [id number]  DATE: 2022  TIME: 2:34 PM    Assessment and Plan   Acute bilateral low back pain without sciatica [M54 50]  1  Acute bilateral low back pain without sciatica  ketorolac (TORADOL) injection 30 mg    cyclobenzaprine (FLEXERIL) 10 mg tablet    ibuprofen (MOTRIN) 800 mg tablet   Patient presents with acute low back pain after bending and twisting injury  She has no red flag symptoms and no neurologic deficits x-rays are not indicated at this time  Patient is provided with a Toradol shot clinic and I have provided her with prescriptions for Flexeril and ibuprofen she is also referred to the Comprehensive Spine program for definitive treatment and management  Patient will hold NSAIDs for the next 8 hours  We discussed ice and heat as well  We discussed symptoms of cauda equina in detail and when to report to the emergency room  Patient Instructions     Patient Instructions   Take ibuprofen as prescribed for the next 5-7 days  Take with food  Supplement with Tylenol 1000 mg every 6-8 hours  Do not exceed more than 4000 mg in 24 hours  Recommend alternating with ibuprofen approximately every 4 hours  Ice for the next 24 hours  20 minutes on 20 minutes off  After 24 hours from injury switched heat 20 minutes on 20 minutes off  Comprehensive spine referral placed for definitive treatment and management they should reach out to you in the next for 24-48 hours  If symptoms worsen or new symptoms develop such as numbness and tingling in your vagina or butt or changes in bowel bladder function report to the emergency room immediately      Follow up with PCP in 3-5 days  Proceed to  ER if symptoms worsen  Chief Complaint     Chief Complaint   Patient presents with    Back Pain     Lower Back pain after doing bending and turning to put hay into shed   States she felt crack and felt shooting pain up her body  Happened this AM  Difficult to stand up from sitting position, only able to take small steps  History of Present Illness       42year old female presents with complaint of acute low back pain  Pt reports she was bending and twisting to grab a hay bale to load it into the feeder when she felt a pop and pull in her low back  This occurred around 10AM this morning and symptoms have been worsening since that time  She reports pain rated 9/10 and describes it as a stabbing and squeezing  Patient denies any pain radiating into the lower extremities but states she had pain shooting up her back in going down both legs when the incident initially happened  Patient denies any numbness or tingling to bilateral lower extremities, she denies any saddle anesthesias, , saddle paresthesias, and changes in bowel bladder function  No other concerns or complaints today  Back Pain  This is a new problem  The current episode started today  The problem occurs constantly  The problem has been gradually worsening since onset  The pain is present in the lumbar spine  The quality of the pain is described as cramping and stabbing  The pain does not radiate  The pain is at a severity of 9/10  The pain is severe  The pain is the same all the time  Exacerbated by: "everything"  Stiffness is present all day  Pertinent negatives include no bladder incontinence, bowel incontinence, dysuria, fever, leg pain, numbness, paresis, paresthesias, pelvic pain, perianal numbness, tingling or weakness  Review of Systems   Review of Systems   Constitutional: Negative for chills and fever  Gastrointestinal: Negative for bowel incontinence  Genitourinary: Negative for bladder incontinence, dysuria and pelvic pain  Musculoskeletal: Positive for back pain  Neurological: Negative for tingling, weakness, numbness and paresthesias           Current Medications       Current Outpatient Medications:     cholecalciferol (VITAMIN D3) 400 units tablet, Take 400 Units by mouth daily, Disp: , Rfl:     cyclobenzaprine (FLEXERIL) 10 mg tablet, Take 1 tablet (10 mg total) by mouth 3 (three) times a day as needed for muscle spasms, Disp: 15 tablet, Rfl: 0    ibuprofen (MOTRIN) 800 mg tablet, Take 1 tablet (800 mg total) by mouth every 8 (eight) hours as needed for moderate pain, Disp: 30 tablet, Rfl: 0    Multiple Vitamin (multivitamin) tablet, Take 1 tablet by mouth daily, Disp: , Rfl:   No current facility-administered medications for this visit  Current Allergies     Allergies as of 08/09/2022    (No Known Allergies)            The following portions of the patient's history were reviewed and updated as appropriate: allergies, current medications, past family history, past medical history, past social history, past surgical history and problem list      Past Medical History:   Diagnosis Date    Benign neoplasm parotid gland     LEFT SUPERFICIAL PAROTID GLAND, BENIGN    Chlamydia     19-ROUTINE SCREEN    Depression     NO MEDS- DR Thierry Amin    Gonorrhea     23- ROUTINE SCREEN    Pap smear for cervical cancer screening     5/2016-wnl, HRHPV neg; 4/2021-wnl, HRHPV neg    Simple ovarian cyst 10/2010    RIGHT, SIMPLE [id number] MM  STABLE THROUGH AT LEAST 9/13    Varicella        Past Surgical History:   Procedure Laterality Date    BREAST BIOPSY      2011: RIGHT BREAST, BENIGN  DR Cinda Worthington; 5/15: LEFT BREAST, BENIGN  HEMATOMA P-OP 9/15; 11/15: LEFT BENIGN CYST AND WALL; 9/15: LEFT AS REACCUMULATED AND NOW COMPOUND CYSTIC STRUCTURE    BREAST BIOPSY      2003/04: MULTIPLE, BENIGN DR VILLALTA; 5/15: LEFT BENIGN    BREAST LUMPECTOMY      L breast x 1 and x 1 on right    KNEE SURGERY Left     REPAIR ANKLE LIGAMENT Left     I ANKLE BONE SPUR    SALIVARY GLAND SURGERY Left 2012    EXCISION OF PARATID TUMOR/GLAND; PAROTID (SUPERFICIAL), BENIGN    SHOULDER SURGERY Right     WISDOM TOOTH EXTRACTION         Family History   Problem Relation Age of Onset    Depression Mother     Hyperlipidemia Mother     Hypertension Mother     No Known Problems Father         estranged    No Known Problems Sister         estranged    No Known Problems Brother         estranged    Coronary artery disease Maternal Grandmother     Cervical cancer Maternal Grandmother         IN 50'S    Diabetes type II Maternal Grandmother     Heart failure Maternal Grandmother     No Known Problems Maternal Grandfather         unsure    No Known Problems Maternal Aunt     No Known Problems Maternal Aunt     No Known Problems Maternal Aunt     No Known Problems Paternal Aunt     No Known Problems Paternal Aunt     No Known Problems Paternal Aunt     Diabetes type II Other     Breast cancer Neg Hx     Ovarian cancer Neg Hx     Colon cancer Neg Hx          Medications have been verified  Objective   /68   Pulse 77   Temp 98 5 °F (36 9 °C)   Resp 18   LMP 07/24/2022   SpO2 99%   Patient's last menstrual period was 07/24/2022  Physical Exam     Physical Exam  Vitals and nursing note reviewed  Constitutional:       General: She is awake  She is in acute distress  Appearance: Normal appearance  She is well-developed and well-groomed  She is not ill-appearing, toxic-appearing or diaphoretic  Comments: Pt walking slowly and very guarded, bent 10 degrees at the waist  In obvious severe pain  HENT:      Head: Normocephalic and atraumatic  Right Ear: Hearing and external ear normal       Left Ear: Hearing and external ear normal       Nose: No nasal deformity  Mouth/Throat:      Lips: Pink  No lesions  Eyes:      General: Lids are normal  Vision grossly intact  Gaze aligned appropriately  Extraocular Movements: Extraocular movements intact  Cardiovascular:      Rate and Rhythm: Normal rate  Pulses:           Posterior tibial pulses are 2+ on the right side and 2+ on the left side     Pulmonary:      Effort: Pulmonary effort is normal       Comments: Patient is speaking in full sentences with no increased respiratory effort  No audible wheezing or stridor  Musculoskeletal:      Cervical back: Normal range of motion  Lumbar back: Spasms (left low lumbar paraspinal muscles  ) and tenderness (left and right paraspinals) present  No swelling, edema, deformity, lacerations or bony tenderness  Decreased range of motion: ROM testing deferred secondary to pain  Comments: Pt has 2/4 DTR to knee jerk and ankle jerk reflexes  Sensation to BLE is grossly intact and equal  Strength is 5/5 to hip flexion and extension, knee flexion and extension, and ankle dorsiflexion and plantarflexion  Posterior Tibialis pulse is 2+ and brisk bilaterally  Skin:     General: Skin is warm and dry  Neurological:      Mental Status: She is alert and oriented to person, place, and time  Sensory: Sensation is intact  Motor: Motor function is intact  Coordination: Coordination is intact  Gait: Gait is intact  Deep Tendon Reflexes:      Reflex Scores:       Patellar reflexes are 2+ on the right side and 2+ on the left side  Achilles reflexes are 2+ on the right side and 2+ on the left side  Psychiatric:         Attention and Perception: Attention and perception normal          Mood and Affect: Mood and affect normal          Speech: Speech normal          Behavior: Behavior normal  Behavior is cooperative  Note: Portions of this record may have been created with voice recognition software  Occasional wrong word or "sound a like" substitutions may have occurred due to the inherent limitations of voice recognition software  Please read the chart carefully and recognize, using context, where substitutions have occurred  *

## 2022-08-09 NOTE — PATIENT INSTRUCTIONS
Take ibuprofen as prescribed for the next 5-7 days  Take with food  Supplement with Tylenol 1000 mg every 6-8 hours  Do not exceed more than 4000 mg in 24 hours  Recommend alternating with ibuprofen approximately every 4 hours  Ice for the next 24 hours  20 minutes on 20 minutes off  After 24 hours from injury switched heat 20 minutes on 20 minutes off  Comprehensive spine referral placed for definitive treatment and management they should reach out to you in the next for 24-48 hours    If symptoms worsen or new symptoms develop such as numbness and tingling in your vagina or butt or changes in bowel bladder function report to the emergency room immediately

## 2022-08-10 ENCOUNTER — NURSE TRIAGE (OUTPATIENT)
Dept: PHYSICAL THERAPY | Facility: OTHER | Age: 47
End: 2022-08-10

## 2022-08-10 ENCOUNTER — TELEPHONE (OUTPATIENT)
Dept: PHYSICAL THERAPY | Facility: OTHER | Age: 47
End: 2022-08-10

## 2022-08-10 DIAGNOSIS — M54.50 ACUTE EXACERBATION OF CHRONIC LOW BACK PAIN: Primary | ICD-10-CM

## 2022-08-10 DIAGNOSIS — G89.29 ACUTE EXACERBATION OF CHRONIC LOW BACK PAIN: Primary | ICD-10-CM

## 2022-08-10 NOTE — TELEPHONE ENCOUNTER
Additional Information   Negative: Has the patient had unexplained weight loss?  Negative: Does the patient have a fever?  Negative: Is the patient experiencing blood in sputum?  Negative: Is the patient experiencing urine retention?  Negative: Is the patient experiencing acute drop foot or paralysis?  Negative: Has the patient experienced major trauma? (fall from height, high speed collision, direct blow to spine) and is also experiencing nausea, light-headedness, or loss of consciousness?  Negative: Is this a chronic condition? Protocols used: SL AMB COMPREHENSIVE SPINE PROGRAM PROTOCOL    This RN did review in detail the Comprehensive Spine Program and what we can provide for their back pain  Patient is agreeable to being triaged by this RN and would like to proceed with Physical Therapy  Referral was placed for Physical Therapy at the [facility] site  Patients information was sent to the  to make evaluation appointment  Patient made aware that the PT office  will be calling to schedule the appointment  Patient was provided with the phone number to the PT office  No further questions and/or concerns were voiced by the patient at this time  Patient states understanding of the referral that was placed  Referral Closed

## 2022-08-10 NOTE — TELEPHONE ENCOUNTER
Additional Information   Negative: Is this related to a work injury?  Negative: Is this related to an MVA?  Negative: Are you currently recieving homecare services? Background - Initial Assessment  Clinical complaint: Pain is bilat low back, L>R, feels like buttocks is clenching itself, like a pulling feeling  No radiation down legs  No numbness or tingling  Started 8/9/22  Was putting hay into a feeder during the onset  Was seen @ [facility] 8/9/22  HX of back pain on and off for years  Was in PT at [facility] last year     Date of onset: 8/9/22  Frequency of pain: intermittent ok while sitting  Quality of pain: sharp and throbbing    Protocols used: SL AMB COMPREHENSIVE SPINE PROGRAM PROTOCOL

## 2022-08-15 ENCOUNTER — OFFICE VISIT (OUTPATIENT)
Dept: PHYSICAL THERAPY | Facility: CLINIC | Age: 47
End: 2022-08-15
Payer: COMMERCIAL

## 2022-08-15 DIAGNOSIS — G89.29 ACUTE EXACERBATION OF CHRONIC LOW BACK PAIN: Primary | ICD-10-CM

## 2022-08-15 DIAGNOSIS — M54.50 ACUTE EXACERBATION OF CHRONIC LOW BACK PAIN: Primary | ICD-10-CM

## 2022-08-15 PROCEDURE — 97140 MANUAL THERAPY 1/> REGIONS: CPT

## 2022-08-15 PROCEDURE — 97161 PT EVAL LOW COMPLEX 20 MIN: CPT

## 2022-08-15 NOTE — LETTER
August 15, 2022    Michael Councilman, MD [address]  [address] [facility] [address]    Patient: Georgina Deras   YOB: 1975   Date of Visit: 8/15/2022     Encounter Diagnosis     ICD-10-CM    1  Acute exacerbation of chronic low back pain  M54 50 Ambulatory referral to PT spine    G89 [id number]        Dear Dr Laureano Sotomayor: Thank you for your recent referral of Georgina Deras  Please review the attached evaluation summary from Nellie's recent visit  Please verify that you agree with the plan of care by signing the attached order  If you have any questions or concerns, please do not hesitate to call  I sincerely appreciate the opportunity to share in the care of one of your patients and hope to have another opportunity to work with you in the near future  Sincerely,    Kim Orona, PT      Referring Provider:      I certify that I have read the below Plan of Care and certify the need for these services furnished under this plan of treatment while under my care  Michael Councilman, MD [address]  [address]  [facility]  [address]  [id number]  [address] [place]          PT Evaluation     Today's date: 8/15/2022  Patient name: Georgina Deras  : 1975  MRN: [id number]  Referring provider: Kaylah Malik PT  Dx:   Encounter Diagnosis     ICD-10-CM    1  Acute exacerbation of chronic low back pain  M54 50     G89 29                   Assessment  Assessment details: Georgina Deras is a 42 y o  female presents with LBP with mechanical directional preference for extension, also traction bias  Pain aggravated by lumbar flexion  Presentation consistent with lumbar posterior disc derangement, suspect L5 right posterolateral  Georgina Deras has the above listed impairments and will benefit from skilled PT to improve deficits to return to prior level of function    Georgina Deras was educated exgtensively on eval findings and plan for management, spine anatomy, safe body mechanics avoiding lumbar flexion, disc diurnal variation, need for erect sitting posture  HEP initiated  [facility] would benefit from skilled services to improve ROM, strength, flexibility, and function, and to decrease pain  Mraio Wei is a 42 y o  female was evaluated on 8/15/2022 under Direct Access for Acute exacerbation of chronic low back pain  (primary encounter diagnosis)  Mario Wei has the above listed impairments and will benefit from skilled PT to improve deficits to return to prior level of function  Under direct access, the patient can be treated for 30 days without a prescription from the physician  If you agree to the patient's plan of care, please sign and return  Please do not hesitate to contact me at [phone]  Thank you for allowing me to participate in Mario Wei care         Impairments: abnormal gait, abnormal or restricted ROM, activity intolerance, impaired physical strength, lacks appropriate home exercise program, pain with function, poor posture  and poor body mechanics  Understanding of Dx/Px/POC: good   Prognosis: good    Goals  2 wks  - No pain > 5/10  - Increase strength 1/3 grade  - Increase lumbar ROM at least 10 deg where applicable    4-6 wks  - Pain 0/10  - Strength 5/5  - Lumbar ROM WFL and painfree  - Independent with HEP for self management  - Functional Status Measure at least 66 (score 31 at eval)  - Return to baseline tolerance for sitting  - RTW normal duty in 1-2 wks      Plan  Patient would benefit from: skilled physical therapy  Planned modality interventions: cryotherapy and thermotherapy: hydrocollator packs  Planned therapy interventions: abdominal trunk stabilization, joint mobilization, manual therapy, neuromuscular re-education, body mechanics training, patient education, postural training, stretching, strengthening, therapeutic activities, therapeutic exercise, flexibility and home exercise program  Frequency: 2x week  Duration in visits: 8  Duration in weeks: 4  Treatment plan discussed with: patient and family        Subjective Evaluation    History of Present Illness  Mechanism of injury: Pt reporting on 22 doing "normal routine", light ADLs, then putting hay in hay feeder for goats, "it was like a quick snap or pop, pain shooting up through my whole body, I couldn't see for a few seconds "  Reports "maybe hunched and twisted, lifting, I do it all the time and it never bothered me  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, folding pankaj at a store  I had a cart in front of me, but I'm tall and I may have been hunching a little bit "  Pt taken by  to ER day of incident  No dx tests  Issued Flexoril, ibuprofen  Stopped taking Flexoril, still taking ibuprofen  Notes PMH LBP with PT  Has been out of PT x 1 year, attended PT x 3-4 months  No PSH lumbar spine  Chief complaint central lumbosacral, aggravated by sitting, sit to stand, twisting  Better in standing  "I feel a lot better today than I did Tuesday when it happened "  Valsalva (+) for LBP  Pt denies saddle paresthesias, bowel/bladder changes, LE sx    Pain  Current pain ratin  At best pain ratin  At worst pain rating: 10  Location: Central lumbosacral  Quality: sharp  Aggravating factors: sitting, lifting and overhead activity  Progression: improved    Social Support  Steps to enter house: yes  3  Stairs in house: yes   14  Lives in: multiple-level home  Lives with: spouse    Employment status: not working (Off from work due to LBP, works in retail; also farming)    Diagnostic Tests  No diagnostic tests performed  Treatments  Previous treatment: physical therapy, medication and chiropractic  Current treatment: medication  Patient Goals  Patient goals for therapy: decreased pain, increased motion, increased strength, independence with ADLs/IADLs, return to work and return to [employer] activities  Patient goal: Care for goats        Objective       Gait: no AD, decreased oneal, non-antalgic, decreased stride length B  Posture: decreased thoracic kyphosis, increased lumbar lordosis  OH squat: depth 50%, good balance, trunk held rigidly erect    Lumbar ROM: flexion 45 deg, LBP  Ext 12 deg, no effect (NE)  LIZ with LB pinch, no worse  LBP with flexion > LIZ  EIL, REIL pain free  SBR 12 deg, low back pinch  SBL 15 deg, low back stretch  R rotation 53 deg, NE   L rotation 50 deg, NE       MMT: B glute max 3-/5, glute medius 4/5  Joint mobilizations: R UPA L5 tender  Otherwise B UPA, PA L1-L5 with no effect  Special tests: B stand march test (+)  No LLD, level ASIS in supine  Pt pain free prior to application of manual traction, pain free with application  B SLR (-)  Palpation: B SI joints non-TTP  Flexibility: B gastroc, hip flexors, hamstrings mildly tight  B Ayana's (-)           Precautions: none      Manuals 8/15/ 22            Man txn 30/10" 3'            R UPA, PA L5 G3            L lumbar rotation mob G2                         Neuro Re-Ed             PPU x10            LIZ x10            TA cx nv                                                                Ther Ex             TM             Bridge nv            Clamshell nv            Prone plank              Prone swim                                                    Ther Activity             T ball squat              FSU                                                    Modalities

## 2022-08-15 NOTE — PROGRESS NOTES
PT Evaluation     Today's date: 8/15/2022  Patient name: Ernestina Chung  : 1975  MRN: [id number]  Referring provider: Chance Baum PT  Dx:   Encounter Diagnosis     ICD-10-CM    1  Acute exacerbation of chronic low back pain  M54 50     G89 29                   Assessment  Assessment details: Ernestina Chung is a 42 y o  female presents with LBP with mechanical directional preference for extension, also traction bias  Pain aggravated by lumbar flexion  Presentation consistent with lumbar posterior disc derangement, suspect L5 right posterolateral  Ernestina Chung has the above listed impairments and will benefit from skilled PT to improve deficits to return to prior level of function  Ernestina Chung was educated exgtensively on eval findings and plan for management, spine anatomy, safe body mechanics avoiding lumbar flexion, disc diurnal variation, need for erect sitting posture  HEP initiated  Mayen Shady would benefit from skilled services to improve ROM, strength, flexibility, and function, and to decrease pain  Ernestina Chung is a 42 y o  female was evaluated on 8/15/2022 under Direct Access for Acute exacerbation of chronic low back pain  (primary encounter diagnosis)  Ernestina Chung has the above listed impairments and will benefit from skilled PT to improve deficits to return to prior level of function  Under direct access, the patient can be treated for 30 days without a prescription from the physician  If you agree to the patient's plan of care, please sign and return  Please do not hesitate to contact me at [phone]  Thank you for allowing me to participate in Ernestina Chung care         Impairments: abnormal gait, abnormal or restricted ROM, activity intolerance, impaired physical strength, lacks appropriate home exercise program, pain with function, poor posture  and poor body mechanics  Understanding of Dx/Px/POC: good   Prognosis: good    Goals  2 wks  - No pain > 5/10  - Increase strength 1/3 grade  - Increase lumbar ROM at least 10 deg where applicable    4-6 wks  - Pain 0/10  - Strength 5/5  - Lumbar ROM WFL and painfree  - Independent with HEP for self management  - Functional Status Measure at least 66 (score 31 at eval)  - Return to baseline tolerance for sitting  - RTW normal duty in 1-2 wks      Plan  Patient would benefit from: skilled physical therapy  Planned modality interventions: cryotherapy and thermotherapy: hydrocollator packs  Planned therapy interventions: abdominal trunk stabilization, joint mobilization, manual therapy, neuromuscular re-education, body mechanics training, patient education, postural training, stretching, strengthening, therapeutic activities, therapeutic exercise, flexibility and home exercise program  Frequency: 2x week  Duration in visits: 8  Duration in weeks: 4  Treatment plan discussed with: patient and family        Subjective Evaluation    History of Present Illness  Mechanism of injury: Pt reporting on 8/9/22 doing "normal routine", light ADLs, then putting hay in hay feeder for goats, "it was like a quick snap or pop, pain shooting up through my whole body, I couldn't see for a few seconds "  Reports "maybe hunched and twisted, lifting, I do it all the time and it never bothered me  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, folding pankaj at a store  I had a cart in front of me, but I'm tall and I may have been hunching a little bit "  Pt taken by  to ER day of incident  No dx tests  Issued Flexoril, ibuprofen  Stopped taking Flexoril, still taking ibuprofen  Notes PMH LBP with PT  Has been out of PT x 1 year, attended PT x 3-4 months  No PSH lumbar spine  Chief complaint central lumbosacral, aggravated by sitting, sit to stand, twisting  Better in standing  "I feel a lot better today than I did Tuesday when it happened "  Valsalva (+) for LBP  Pt denies saddle paresthesias, bowel/bladder changes, LE sx    Pain  Current pain rating: 2  At best pain ratin  At worst pain rating: 10  Location: Central lumbosacral  Quality: sharp  Aggravating factors: sitting, lifting and overhead activity  Progression: improved    Social Support  Steps to enter house: yes  3  Stairs in house: yes   14  Lives in: multiple-level home  Lives with: spouse    Employment status: not working (Off from work due to LBP, works in retail; also farming)    Diagnostic Tests  No diagnostic tests performed  Treatments  Previous treatment: physical therapy, medication and chiropractic  Current treatment: medication  Patient Goals  Patient goals for therapy: decreased pain, increased motion, increased strength, independence with ADLs/IADLs, return to work and return to [employer] activities  Patient goal: Care for goats        Objective       Gait: no AD, decreased oneal, non-antalgic, decreased stride length B  Posture: decreased thoracic kyphosis, increased lumbar lordosis  OH squat: depth 50%, good balance, trunk held rigidly erect    Lumbar ROM: flexion 45 deg, LBP  Ext 12 deg, no effect (NE)  LIZ with LB pinch, no worse  LBP with flexion > LIZ  EIL, REIL pain free  SBR 12 deg, low back pinch  SBL 15 deg, low back stretch  R rotation 53 deg, NE   L rotation 50 deg, NE       MMT: B glute max 3-/5, glute medius 4/5  Joint mobilizations: R UPA L5 tender  Otherwise B UPA, PA L1-L5 with no effect  Special tests: B stand march test (+)  No LLD, level ASIS in supine  Pt pain free prior to application of manual traction, pain free with application  B SLR (-)  Palpation: B SI joints non-TTP  Flexibility: B gastroc, hip flexors, hamstrings mildly tight  B Ayana's (-)           Precautions: none      Manuals 8/15/ 22            Man txn 30/10" 3'            R UPA, PA L5 G3            L lumbar rotation mob G2                         Neuro Re-Ed             PPU x10            LIZ x10            TA cx nv Ther Ex             TM             Bridge josé miguel Wise nv            Prone plank              Prone swim                                                    Ther Activity             T ball squat              FSU                                                    Modalities

## 2022-08-18 ENCOUNTER — OFFICE VISIT (OUTPATIENT)
Dept: PHYSICAL THERAPY | Facility: CLINIC | Age: 47
End: 2022-08-18
Payer: COMMERCIAL

## 2022-08-18 DIAGNOSIS — M54.50 ACUTE EXACERBATION OF CHRONIC LOW BACK PAIN: Primary | ICD-10-CM

## 2022-08-18 DIAGNOSIS — G89.29 ACUTE EXACERBATION OF CHRONIC LOW BACK PAIN: Primary | ICD-10-CM

## 2022-08-18 PROCEDURE — 97112 NEUROMUSCULAR REEDUCATION: CPT

## 2022-08-18 PROCEDURE — 97140 MANUAL THERAPY 1/> REGIONS: CPT

## 2022-08-18 PROCEDURE — 97110 THERAPEUTIC EXERCISES: CPT

## 2022-08-18 NOTE — PROGRESS NOTES
Daily Note     Today's date: 2022  Patient name: Duane Azure  : 1975  MRN: [id number]  Referring provider: Kwan Shaw, PT  Dx:   Encounter Diagnosis     ICD-10-CM    1  Acute exacerbation of chronic low back pain  M54 50     G89 29                   Subjective: LBP 1/10  Yet to RTW   "I had a rough day yesterday, I had to sit for a prolonged period, I'm better standing "      Objective: See treatment diary below  Verbal cues for body mechanics with sit to supine transfer avoiding lumbar flexion  Transverse abdominis cx Fair via palpation  Assessment: Tolerated treatment well  Patient would benefit from continued PT  HEP progressed  Pain improved  Plan: Continue per plan of care        Precautions: none      Manuals 8/15/ 22 8/18           Man txn 30/10" 3' 5'           R LATISHA PINEDA L5 G3 G3           L lumbar rotation mob G2 G3                        Neuro Re-Ed             PPU x10 x10           LIZ x10 x10           TA cx nv 10"x15                                                               Ther Ex             TM  10'           Bridge nv 10"x15           Clamshell nv 10"x15           Prone plank              Prone swim                                                    Ther Activity             T ball squat   2x10           FSU                                                    Modalities

## 2022-08-22 ENCOUNTER — OFFICE VISIT (OUTPATIENT)
Dept: PHYSICAL THERAPY | Facility: CLINIC | Age: 47
End: 2022-08-22
Payer: COMMERCIAL

## 2022-08-22 DIAGNOSIS — M54.50 ACUTE EXACERBATION OF CHRONIC LOW BACK PAIN: Primary | ICD-10-CM

## 2022-08-22 DIAGNOSIS — G89.29 ACUTE EXACERBATION OF CHRONIC LOW BACK PAIN: Primary | ICD-10-CM

## 2022-08-22 PROCEDURE — 97110 THERAPEUTIC EXERCISES: CPT

## 2022-08-22 PROCEDURE — 97140 MANUAL THERAPY 1/> REGIONS: CPT

## 2022-08-22 PROCEDURE — 97112 NEUROMUSCULAR REEDUCATION: CPT

## 2022-08-22 NOTE — PROGRESS NOTES
Daily Note     Today's date: 2022  Patient name: Jeff Kulkarni  : 1975  MRN: [id number]  Referring provider: Levi Teixeira, PT  Dx:   Encounter Diagnosis     ICD-10-CM    1  Acute exacerbation of chronic low back pain  M54 50     G89 29                   Subjective: LBP currently 0/10, "it's getting better "  Notes LBP in sitting, but this improving as well, being tolerated for longer periods  Objective: See treatment diary below  Stand side glide to R pain free  Assessment: Tolerated treatment well  Patient exhibited good technique with therapeutic exercises and would benefit from continued PT  HEP progressed  Pain improving  Plan: Continue per plan of care           Precautions: none      Manuals 8/15/ 22 8/18 8/22          Man txn 30/10" 3' 5' 5'          R UPA, PA L5 G3 G3 G3          L lumbar rotation mob G2 G3 G3                       Neuro Re-Ed             PPU x10 x10 x10          LIZ x10 x10 x10          TA cx nv 10"x15 x30                                                              Ther Ex             TM  10' 10'          Bridge nv 10"x15 x30          Clamshell nv 10"x15 x30          Prone plank              Prone swim             Stand side glide to R   20"x5                                    Ther Activity             T ball squat   2x10 3x10          FSU                                                    Modalities

## 2022-08-24 ENCOUNTER — OFFICE VISIT (OUTPATIENT)
Dept: PHYSICAL THERAPY | Facility: CLINIC | Age: 47
End: 2022-08-24
Payer: COMMERCIAL

## 2022-08-24 DIAGNOSIS — G89.29 ACUTE EXACERBATION OF CHRONIC LOW BACK PAIN: Primary | ICD-10-CM

## 2022-08-24 DIAGNOSIS — M54.50 ACUTE EXACERBATION OF CHRONIC LOW BACK PAIN: Primary | ICD-10-CM

## 2022-08-24 PROCEDURE — 97110 THERAPEUTIC EXERCISES: CPT

## 2022-08-24 PROCEDURE — 97112 NEUROMUSCULAR REEDUCATION: CPT

## 2022-08-24 PROCEDURE — 97140 MANUAL THERAPY 1/> REGIONS: CPT

## 2022-08-24 NOTE — PROGRESS NOTES
Daily Note     Today's date: 2022  Patient name: Neeta Hoffman  : 1975  MRN: [id number]  Referring provider: Natanael Carrera, PT  Dx:   Encounter Diagnosis     ICD-10-CM    1  Acute exacerbation of chronic low back pain  M54 50     G89 29                   Subjective: "Just a little pinch, if I had to give it a number, maybe 1" /10 L LBP (typically R)  States she's had this little pinching pain on L LB since performing press ups a lot  Her R sided LBP has been less, 0/10  "nothing unless I move it the wrong way "  Notes overall feeling pretty good today  Objective: See treatment diary below  FOTO score: 51 (intake score: 31)    Assessment: Tolerated treatment well  Patient would benefit from continued PT For improved strength, flexibility and overall function  Functional improvement evident with improved FOTO score  Pain decreasing  Plan: Continue per plan of care        Precautions: none      Manuals 8/15/ 22 8/18 8/22 8/24         Man txn 30/10" 3' 5' 5' 5'         R UPA, PA L5 G3 G3 G3 G3 HJ         L lumbar rotation mob G2 G3 G3 G3 HJ                      Neuro Re-Ed             PPU x10 x10 x10 x10         LIZ x10 x10 x10 x10         TA cx nv 10"x15 x30 HEP                                                             Ther Ex             TM  10' 10' 10'         Bridge nv 10"x15 x30 x30         Clamshell nv 10"x15 x30 x30         Prone plank              Prone swim             Stand side glide to R   20"x5 x5                                   Ther Activity             T ball squat   2x10 3x10 3x10         FSU    4'' 2x10                                                Modalities

## 2022-08-29 ENCOUNTER — APPOINTMENT (OUTPATIENT)
Dept: PHYSICAL THERAPY | Facility: CLINIC | Age: 47
End: 2022-08-29
Payer: COMMERCIAL

## 2022-08-31 ENCOUNTER — OFFICE VISIT (OUTPATIENT)
Dept: PHYSICAL THERAPY | Facility: CLINIC | Age: 47
End: 2022-08-31
Payer: COMMERCIAL

## 2022-08-31 DIAGNOSIS — M54.50 ACUTE EXACERBATION OF CHRONIC LOW BACK PAIN: Primary | ICD-10-CM

## 2022-08-31 DIAGNOSIS — G89.29 ACUTE EXACERBATION OF CHRONIC LOW BACK PAIN: Primary | ICD-10-CM

## 2022-08-31 PROCEDURE — 97530 THERAPEUTIC ACTIVITIES: CPT

## 2022-08-31 PROCEDURE — 97110 THERAPEUTIC EXERCISES: CPT

## 2022-08-31 PROCEDURE — 97140 MANUAL THERAPY 1/> REGIONS: CPT

## 2022-08-31 NOTE — PROGRESS NOTES
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, walking, 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  "I've definitely made progress, I just don't want to do anything to screw my back up again "      Objective: See treatment diary below  Encouraged pt to f/u with PCP RE: RTW release to get physician involvement in restrictions, to coordinate with PT PRN  Verbal cues for safe sit to supine body mechanics  Encouraged pt to obtain job description of expected duties for her position to aid in completing RTW paperwork  Notes "sometimes I feel it on the right side if I move the wrong way, sometimes with the exercises I'll feel it a little bit on the left side "    Lumbar flexion in standing 70 deg, no pain (45 deg with LBP at eval)  Repeated flexion in standing with midline low back discomfort, 10 reps  Assessment: Tolerated treatment well  Patient would benefit from continued PT  Pt with pain at worst improved from 1010 at eval to 1/10, today 5th PT visit  Pt with lumbar flexion ROM WFL and pain free, but remains mildly symptomatic with repeated flexion  Suspect her normal job duties involving repetitive bending would likely exacerbate her pain at this point after a 5-8 hr shift    Recommend continued PT, at her present rate of recovery expect pt to be recovered with repetitive standing lumbar flexion in approximately 2 wks so long as no other injuries  Plan: Continue per plan of care  Pt to f/u with PCP for input RE: RTW release paperwork, with PT input PRN       Precautions: none      Manuals 8/15/ 22 8/18 8/22 8/24 8/31        Man txn 30/10" 3' 5' 5' 5' 5'        R UPA, PA L5 G3 G3 G3 G3 HJ G3 PA only        L lumbar rotation mob G2 G3 G3 G3 HJ                      Neuro Re-Ed             PPU x10 x10 x10 x10 x10        LIZ x10 x10 x10 x10 x10        TA cx nv 10"x15 x30 HEP                                                             Ther Ex             TM  10' 10' 10'         Bridge nv 10"x15 x30 x30 x30        Clamshell nv 10"x15 x30 x30 x30        Prone plank      Knees 10"x 10        Prone swim     nv        Stand side glide to R   20"x5 x5 hold                                  Ther Activity             T ball squat   2x10 3x10 3x10 3x10        FSU    4'' 2x10 3x10                                               Modalities

## 2022-09-07 ENCOUNTER — OFFICE VISIT (OUTPATIENT)
Dept: PHYSICAL THERAPY | Facility: CLINIC | Age: 47
End: 2022-09-07
Payer: COMMERCIAL

## 2022-09-07 DIAGNOSIS — G89.29 ACUTE EXACERBATION OF CHRONIC LOW BACK PAIN: Primary | ICD-10-CM

## 2022-09-07 DIAGNOSIS — M54.50 ACUTE EXACERBATION OF CHRONIC LOW BACK PAIN: Primary | ICD-10-CM

## 2022-09-07 PROCEDURE — 97110 THERAPEUTIC EXERCISES: CPT

## 2022-09-07 PROCEDURE — 97140 MANUAL THERAPY 1/> REGIONS: CPT

## 2022-09-07 NOTE — PROGRESS NOTES
Daily Note     Today's date: 2022  Patient name: Kelle Richardson  : 1975  MRN: [id number]  Referring provider: Maria Luisa Bocanegra, PT  Dx:   Encounter Diagnosis     ICD-10-CM    1  Acute exacerbation of chronic low back pain  M54 50     G89 29                   Subjective: "I don't have any pain right now  I didn't follow up with my doctor (as discussed last visit to get assistance with RTW paperwork, get PT Rx to continue beyond 30 days of direct access)  I know she's going to send me for more tests and I'm already overextended on my medical bills  I don't know if I'm going back to that job  I don't know if I'm going back to that doctor "      Objective: See treatment diary below  Again urged pt to f/u with physician as she is nearing the 30 day direct access briana and will need PT Rx to continue  Assessment: Tolerated treatment well  Patient exhibited good technique with therapeutic exercises  Pt not compliant with PT guidance to f/u with PCP for assist with RTW paperwork, and to obtain PT Rx to continue PT beyond 30 days of direct access  HEP progressed  Pt unsure of her plans for RTW or physician f/u  Plan: Continue per plan of care  Re-eval next week for D/C to HEP unless pt obtains new PT Rx to continue       Precautions: none      Manuals 8/15/ 22 8/18 8/22 8/24 8/31 9/7       Man txn 30/10" 3' 5' 5' 5' 5' 5'       R UPA, PA L5 G3 G3 G3 G3 HJ G3 PA only G3 PA       L lumbar rotation mob G2 G3 G3 G3 HJ                      Neuro Re-Ed             PPU x10 x10 x10 x10 x10 x10       LIZ x10 x10 x10 x10 x10 x10       TA cx nv 10"x15 x30 HEP                                                             Ther Ex             TM  10' 10' 10'  10'       Bridge nv 10"x15 x30 x30 x30 x30       Clamshell nv 10"x15 x30 x30 x30 x30       Prone plank      Knees 10"x 10 x10       Prone swim     nv x10       Stand side glide to R   20"x5 x5 hold        SKTC      nv       DKTC      nv       Ther Activity T ball squat   2x10 3x10 3x10 3x10 3x10       FSU    4'' 2x10 3x10 3x10                                              Modalities

## 2022-09-08 NOTE — PROGRESS NOTES
Pt called today 9/7/22 to cancel the remainder of her PT visits, telling PT Wali Patterson "I don't like being told what to do  I'll go to the doctor when I'm good and ready  I take my health very seriously "  Pt D/C to HEP  Pt attended 6 visits, missed 0

## 2022-09-09 ENCOUNTER — APPOINTMENT (OUTPATIENT)
Dept: PHYSICAL THERAPY | Facility: CLINIC | Age: 47
End: 2022-09-09
Payer: COMMERCIAL

## 2022-09-12 ENCOUNTER — APPOINTMENT (OUTPATIENT)
Dept: PHYSICAL THERAPY | Facility: CLINIC | Age: 47
End: 2022-09-12
Payer: COMMERCIAL

## 2022-09-14 ENCOUNTER — APPOINTMENT (OUTPATIENT)
Dept: PHYSICAL THERAPY | Facility: CLINIC | Age: 47
End: 2022-09-14
Payer: COMMERCIAL

## 2022-09-23 ENCOUNTER — HOSPITAL ENCOUNTER (OUTPATIENT)
Dept: ULTRASOUND IMAGING | Facility: MEDICAL CENTER | Age: 47
Discharge: HOME | End: 2022-09-23
Payer: COMMERCIAL

## 2022-09-23 DIAGNOSIS — N92.0 MENORRHAGIA WITH REGULAR CYCLE: ICD-10-CM

## 2022-09-23 PROCEDURE — 76856 US EXAM PELVIC COMPLETE: CPT

## 2022-09-23 PROCEDURE — 76830 TRANSVAGINAL US NON-OB: CPT

## 2022-09-27 ENCOUNTER — TELEPHONE (OUTPATIENT)
Dept: OBGYN CLINIC | Facility: CLINIC | Age: 47
End: 2022-09-27

## 2022-10-05 ENCOUNTER — TELEPHONE (OUTPATIENT)
Dept: OBGYN CLINIC | Facility: CLINIC | Age: 47
End: 2022-10-05

## 2022-10-11 NOTE — TELEPHONE ENCOUNTER
I called the patient back and reviewed her labs again and her pelvic ultrasound    Pt with anemia due to heavy menses and thickened ET with suspicion for 2 5 cm polyp or fibroid in the endometrium  Reviewed with patient recommend D&C, hysteroscopy removal of lesion rather than endometrial biopsy as the lesion is likely the source of her bleeding  Explained the difference between the hospital and office procedure and pt is in agreement  Will come in to discuss surgery on 10/21 at 10:15 am and will plan surgery on 11/10  Will need postop appointment  Please call her to schedule the same  You may cancel her office ebx for 11/15

## 2022-10-21 ENCOUNTER — OFFICE VISIT (OUTPATIENT)
Dept: OBGYN CLINIC | Facility: CLINIC | Age: 47
End: 2022-10-21
Payer: COMMERCIAL

## 2022-10-21 VITALS
HEIGHT: 65 IN | DIASTOLIC BLOOD PRESSURE: 74 MMHG | SYSTOLIC BLOOD PRESSURE: 112 MMHG | BODY MASS INDEX: 29.16 KG/M2 | WEIGHT: 175 LBS

## 2022-10-21 DIAGNOSIS — N92.0 MENORRHAGIA WITH REGULAR CYCLE: Primary | ICD-10-CM

## 2022-10-21 DIAGNOSIS — N94.89 ENDOMETRIAL MASS: ICD-10-CM

## 2022-10-21 PROCEDURE — 99214 OFFICE O/P EST MOD 30 MIN: CPT | Performed by: OBSTETRICS & GYNECOLOGY

## 2022-10-21 RX ORDER — FERROUS SULFATE 325(65) MG
325 TABLET ORAL
COMMUNITY

## 2022-10-21 RX ORDER — SODIUM CHLORIDE, SODIUM LACTATE, POTASSIUM CHLORIDE, CALCIUM CHLORIDE 600; 310; 30; 20 MG/100ML; MG/100ML; MG/100ML; MG/100ML
125 INJECTION, SOLUTION INTRAVENOUS CONTINUOUS
OUTPATIENT
Start: 2022-10-21

## 2022-10-21 NOTE — PROGRESS NOTES
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, BENIGN   • Chlamydia     21-ROUTINE SCREEN   • Depression     NO MEDS- DR Elisabet Garcia   • Gonorrhea     25- ROUTINE SCREEN   • Pap smear for cervical cancer screening     5/2016-wnl, HRHPV neg; 4/2021-wnl, HRHPV neg   • Simple ovarian cyst 10/2010    RIGHT, SIMPLE [id number] MM  STABLE THROUGH AT LEAST 9/13   • Varicella        PSHx:   Past Surgical History:   Procedure Laterality Date   • BREAST BIOPSY      2011: RIGHT BREAST, BENIGN  DR Nicolette Corrales; 5/15: LEFT BREAST, BENIGN  HEMATOMA P-OP 9/15; 11/15: LEFT BENIGN CYST AND WALL; 9/15: LEFT AS REACCUMULATED AND NOW COMPOUND CYSTIC STRUCTURE   • BREAST BIOPSY      2003/04: MULTIPLE, BENIGN DR Nicolette Corrales; 5/15: LEFT BENIGN   • BREAST LUMPECTOMY      L breast x 1 and x 1 on right   • KNEE SURGERY Left    • REPAIR ANKLE LIGAMENT Left     I ANKLE BONE SPUR   • SALIVARY GLAND SURGERY Left 2012    EXCISION OF PARATID TUMOR/GLAND; PAROTID (SUPERFICIAL), BENIGN   • SHOULDER SURGERY Right    • WISDOM TOOTH EXTRACTION         Meds: (Not in a hospital admission)      Allergies: No Known Allergies    Social Hx:    Social History     Socioeconomic History   • Marital status: /Civil Union     Spouse name: Dina Lomas   • Number of children: 0   • Years of education: None   • Highest education level: High school graduate   Occupational History   • Occupation: Security   Tobacco Use   • Smoking status: Former Smoker     Packs/day: 1 00     Types: Cigarettes   • Smokeless tobacco: Never Used   • Tobacco comment: [age] yo    Vaping Use   • Vaping Use: Never used Substance and Sexual Activity   • Alcohol use: No   • Drug use: No   • Sexual activity: Yes     Partners: Male     Birth control/protection: Condom Male     Comment: lifetime partners: 3;  2005   Other Topics Concern   • None   Social History Narrative    Islam: no preference    Accepts blood products                Exercise: Farm work daily    Calcium: 1 yogurt everyday, 1 cheese everyday     Social Determinants of Health     Financial Resource Strain: Not on file   Food Insecurity: Not on file   Transportation Needs: Not on file   Physical Activity: Not on file   Stress: Not on file   Social Connections: Not on file   Intimate Partner Violence: Not on file   Housing Stability: Not on file       Ob Hx:   OB History    Para Term  AB Living   0 0 0 0 0 0   SAB IAB Ectopic Multiple Live Births   0 0 0 0 0   Obstetric Comments   Menarche: 14      Menses: 22-26/7-8/spotting for 2-3 days then overnight pad every 1-2 hours for 4 days           Fm Hx:   Family History   Problem Relation Age of Onset   • Depression Mother    • Hyperlipidemia Mother    • Hypertension Mother    • No Known Problems Father         estranged   • No Known Problems Sister         estranged   • No Known Problems Brother         estranged   • Coronary artery disease Maternal Grandmother    • Cervical cancer Maternal Grandmother         IN 50'S   • Diabetes type II Maternal Grandmother    • Heart failure Maternal Grandmother    • No Known Problems Maternal Grandfather         unsure   • No Known Problems Maternal Aunt    • No Known Problems Maternal Aunt    • No Known Problems Maternal Aunt    • No Known Problems Paternal Aunt    • No Known Problems Paternal Aunt    • No Known Problems Paternal Aunt    • Diabetes type II Other    • Breast cancer Neg Hx    • Ovarian cancer Neg Hx    • Colon cancer Neg Hx        ROS:  Review of Systems   Constitutional: Positive for fatigue   Negative for chills, fever and unexpected weight Controlled with current regime change  HENT: Negative for congestion, mouth sores and sore throat  Respiratory: Negative for cough, chest tightness, shortness of breath and wheezing  Cardiovascular: Negative for chest pain and palpitations  Gastrointestinal: Negative for abdominal distention, abdominal pain, constipation, diarrhea, nausea and vomiting  Endocrine: Negative for cold intolerance and heat intolerance  Genitourinary: Positive for menstrual problem  Negative for dyspareunia, dysuria, genital sores, pelvic pain, vaginal bleeding, vaginal discharge and vaginal pain  Musculoskeletal: Negative for arthralgias  Skin: Negative for color change and rash  Neurological: Negative for dizziness, light-headedness and headaches  Hematological: Negative for adenopathy  VS:  /74 (BP Location: Right arm, Patient Position: Sitting, Cuff Size: Adult)   Ht 5' 4 5" (1 638 m)   Wt 79 4 kg (175 lb)   LMP 10/08/2022 (Exact Date)   BMI 29 57 kg/m²       Exam:    Physical Exam  Constitutional:       General: She is not in acute distress  Appearance: Normal appearance  She is normal weight  She is not ill-appearing  Comments: pale   HENT:      Head: Normocephalic and atraumatic  Eyes:      Extraocular Movements: Extraocular movements intact  Conjunctiva/sclera: Conjunctivae normal    Cardiovascular:      Rate and Rhythm: Normal rate and regular rhythm  Heart sounds: Normal heart sounds  Pulmonary:      Effort: Pulmonary effort is normal       Breath sounds: Normal breath sounds  Abdominal:      General: Abdomen is flat  Bowel sounds are normal  There is no distension  Palpations: Abdomen is soft  There is no mass  Tenderness: There is no abdominal tenderness  There is no guarding or rebound  Hernia: No hernia is present  Musculoskeletal:         General: Normal range of motion  Cervical back: Normal range of motion  Skin:     General: Skin is warm        Findings: No erythema or rash  Neurological:      Mental Status: She is alert and oriented to person, place, and time  Psychiatric:         Mood and Affect: Mood normal          Behavior: Behavior normal          Thought Content: Thought content normal          Judgment: Judgment normal          vulva      normal external genitalia for age and right bartholin's cyst--pt declines treatment during surgery    vagina    color pink and rugae  well formed rugae    cervix     parous and no lesions     uterus     NSSC, AF, NT, mobile    adnexa   no masses or tenderness     RV        deferred    Labs:  Lab Results   Component Value Date    WBC 6 65 07/26/2022    HGB 9 4 (L) 07/26/2022    HCT 31 3 (L) 07/26/2022     (H) 07/26/2022     No results found for: PREGTESTUR, PREGSERUM, HCG, HCGQUANT    Imaging:  UTERUS:  The uterus is anteverted in position, measuring 10 0 x 3 8 x 6 0 cm  The uterus has a normal contour and heterogeneous echotexture  Nabothian cyst(s) present, cervix otherwise within normal limits  Fluid within the cervical canal      ENDOMETRIUM:    The endometrial echo complex has an AP caliber of 22 0 mm  Endometrial echo complex appears abnormally thickened for patient age    Suspected lesion within the endometrial canal measuring 2 4 x 1 5 x 1 5 cm      OVARIES/ADNEXA:  Right ovary:  2 9 x 2 4 x 2 6 cm  9 7 mL  No suspicious right ovarian abnormality  Doppler flow within normal limits      Left ovary:  2 6 x 2 2 x 2 1 cm  6 4 mL  No suspicious left ovarian abnormality  Dominant simple benign appearing follicle noted  Doppler flow within normal limits      No suspicious adnexal mass or loculated collections  Anechoic structure in the left adnexa with diameter of 0 8 cm with vascularity could represent prominent vasculature in the left hemipelvis  Correlate clinically for pelvic varices and/or pelvic   congestion syndrome  There is no free fluid      IMPRESSION:     1    Thickening of the endometrial stripe for a premenopausal patient with suspected lesion in the endometrial canal measuring 2 4 x 1 5 x 1 5 cm  Top considerations would include endometrial polyp or submucosal leiomyoma  2   Ovaries are unremarkable with benign-appearing follicles  3   Ancillary findings discussed above        A/P: 41 y o  Chelita Sanchez with Patient's last menstrual period was 10/08/2022 (exact date)  with heavy menses, endometrial mass  for D&C, hysteroscopy, resction of endometrial mass   The risks (infection, bleeding, transfusion, damage to adjacent organs requiring immediate and/or delayed repair, clots inside blood vessels, need to complete procedure using alternative approach, procedural failure, worsening of pre-exisiting medical condition, and death) and alternatives (see outpatient record) have been discussed and patient desires to proceed with D&C, hysteroscopy, resction of endometrial mass at [facility] on 11/17/2022       Consents reviewed and signed  Surgery packet reviewed with patient  All questions answered  Offered I&D of bartholin cyst in OR, but pt declines

## 2022-10-21 NOTE — H&P (VIEW-ONLY)
HPI:  Pt is a 41 y o  Wild White with Patient's last menstrual period was 10/08/2022 (exact date)  using condoms for contraception presents c/o heavy menses, endometrial mass on pelvic ultrasound  Pt presents to discuss management options  After discussion pt desires to proceed with surgical management  PMHx:   Past Medical History:   Diagnosis Date   • Benign neoplasm parotid gland     LEFT SUPERFICIAL PAROTID GLAND, BENIGN   • Chlamydia     21-ROUTINE SCREEN   • Depression     NO MEDS- DR Kevin Ellis   • Gonorrhea     25- ROUTINE SCREEN   • Pap smear for cervical cancer screening     5/2016-wnl, HRHPV neg; 4/2021-wnl, HRHPV neg   • Simple ovarian cyst 10/2010    RIGHT, SIMPLE [id number] MM  STABLE THROUGH AT LEAST 9/13   • Varicella        PSHx:   Past Surgical History:   Procedure Laterality Date   • BREAST BIOPSY      2011: RIGHT BREAST, BENIGN  DR Graeme Adams; 5/15: LEFT BREAST, BENIGN  HEMATOMA P-OP 9/15; 11/15: LEFT BENIGN CYST AND WALL; 9/15: LEFT AS REACCUMULATED AND NOW COMPOUND CYSTIC STRUCTURE   • BREAST BIOPSY      2003/04: MULTIPLE, BENIGN DR Graeme Adams; 5/15: LEFT BENIGN   • BREAST LUMPECTOMY      L breast x 1 and x 1 on right   • KNEE SURGERY Left    • REPAIR ANKLE LIGAMENT Left     I ANKLE BONE SPUR   • SALIVARY GLAND SURGERY Left 2012    EXCISION OF PARATID TUMOR/GLAND; PAROTID (SUPERFICIAL), BENIGN   • SHOULDER SURGERY Right    • WISDOM TOOTH EXTRACTION         Meds: (Not in a hospital admission)      Allergies: No Known Allergies    Social Hx:    Social History     Socioeconomic History   • Marital status: /Civil Union     Spouse name: Kashif Lindquist   • Number of children: 0   • Years of education: None   • Highest education level: High school graduate   Occupational History   • Occupation: Security   Tobacco Use   • Smoking status: Former Smoker     Packs/day: 1 00     Types: Cigarettes   • Smokeless tobacco: Never Used   • Tobacco comment: [age] yo    Vaping Use   • Vaping Use: Never used Substance and Sexual Activity   • Alcohol use: No   • Drug use: No   • Sexual activity: Yes     Partners: Male     Birth control/protection: Condom Male     Comment: lifetime partners: 3;  2005   Other Topics Concern   • None   Social History Narrative    Jehovah's witness: no preference    Accepts blood products                Exercise: Farm work daily    Calcium: 1 yogurt everyday, 1 cheese everyday     Social Determinants of Health     Financial Resource Strain: Not on file   Food Insecurity: Not on file   Transportation Needs: Not on file   Physical Activity: Not on file   Stress: Not on file   Social Connections: Not on file   Intimate Partner Violence: Not on file   Housing Stability: Not on file       Ob Hx:   OB History    Para Term  AB Living   0 0 0 0 0 0   SAB IAB Ectopic Multiple Live Births   0 0 0 0 0   Obstetric Comments   Menarche: 14      Menses: 22-26/7-8/spotting for 2-3 days then overnight pad every 1-2 hours for 4 days           Fm Hx:   Family History   Problem Relation Age of Onset   • Depression Mother    • Hyperlipidemia Mother    • Hypertension Mother    • No Known Problems Father         estranged   • No Known Problems Sister         estranged   • No Known Problems Brother         estranged   • Coronary artery disease Maternal Grandmother    • Cervical cancer Maternal Grandmother         IN 50'S   • Diabetes type II Maternal Grandmother    • Heart failure Maternal Grandmother    • No Known Problems Maternal Grandfather         unsure   • No Known Problems Maternal Aunt    • No Known Problems Maternal Aunt    • No Known Problems Maternal Aunt    • No Known Problems Paternal Aunt    • No Known Problems Paternal Aunt    • No Known Problems Paternal Aunt    • Diabetes type II Other    • Breast cancer Neg Hx    • Ovarian cancer Neg Hx    • Colon cancer Neg Hx        ROS:  Review of Systems   Constitutional: Positive for fatigue   Negative for chills, fever and unexpected weight change  HENT: Negative for congestion, mouth sores and sore throat  Respiratory: Negative for cough, chest tightness, shortness of breath and wheezing  Cardiovascular: Negative for chest pain and palpitations  Gastrointestinal: Negative for abdominal distention, abdominal pain, constipation, diarrhea, nausea and vomiting  Endocrine: Negative for cold intolerance and heat intolerance  Genitourinary: Positive for menstrual problem  Negative for dyspareunia, dysuria, genital sores, pelvic pain, vaginal bleeding, vaginal discharge and vaginal pain  Musculoskeletal: Negative for arthralgias  Skin: Negative for color change and rash  Neurological: Negative for dizziness, light-headedness and headaches  Hematological: Negative for adenopathy  VS:  /74 (BP Location: Right arm, Patient Position: Sitting, Cuff Size: Adult)   Ht 5' 4 5" (1 638 m)   Wt 79 4 kg (175 lb)   LMP 10/08/2022 (Exact Date)   BMI 29 57 kg/m²       Exam:    Physical Exam  Constitutional:       General: She is not in acute distress  Appearance: Normal appearance  She is normal weight  She is not ill-appearing  Comments: pale   HENT:      Head: Normocephalic and atraumatic  Eyes:      Extraocular Movements: Extraocular movements intact  Conjunctiva/sclera: Conjunctivae normal    Cardiovascular:      Rate and Rhythm: Normal rate and regular rhythm  Heart sounds: Normal heart sounds  Pulmonary:      Effort: Pulmonary effort is normal       Breath sounds: Normal breath sounds  Abdominal:      General: Abdomen is flat  Bowel sounds are normal  There is no distension  Palpations: Abdomen is soft  There is no mass  Tenderness: There is no abdominal tenderness  There is no guarding or rebound  Hernia: No hernia is present  Musculoskeletal:         General: Normal range of motion  Cervical back: Normal range of motion  Skin:     General: Skin is warm        Findings: No erythema or rash  Neurological:      Mental Status: She is alert and oriented to person, place, and time  Psychiatric:         Mood and Affect: Mood normal          Behavior: Behavior normal          Thought Content: Thought content normal          Judgment: Judgment normal          vulva      normal external genitalia for age and right bartholin's cyst--pt declines treatment during surgery    vagina    color pink and rugae  well formed rugae    cervix     parous and no lesions     uterus     NSSC, AF, NT, mobile    adnexa   no masses or tenderness     RV        deferred    Labs:  Lab Results   Component Value Date    WBC 6 65 07/26/2022    HGB 9 4 (L) 07/26/2022    HCT 31 3 (L) 07/26/2022     (H) 07/26/2022     No results found for: PREGTESTUR, PREGSERUM, HCG, HCGQUANT    Imaging:  UTERUS:  The uterus is anteverted in position, measuring 10 0 x 3 8 x 6 0 cm  The uterus has a normal contour and heterogeneous echotexture  Nabothian cyst(s) present, cervix otherwise within normal limits  Fluid within the cervical canal      ENDOMETRIUM:    The endometrial echo complex has an AP caliber of 22 0 mm  Endometrial echo complex appears abnormally thickened for patient age    Suspected lesion within the endometrial canal measuring 2 4 x 1 5 x 1 5 cm      OVARIES/ADNEXA:  Right ovary:  2 9 x 2 4 x 2 6 cm  9 7 mL  No suspicious right ovarian abnormality  Doppler flow within normal limits      Left ovary:  2 6 x 2 2 x 2 1 cm  6 4 mL  No suspicious left ovarian abnormality  Dominant simple benign appearing follicle noted  Doppler flow within normal limits      No suspicious adnexal mass or loculated collections  Anechoic structure in the left adnexa with diameter of 0 8 cm with vascularity could represent prominent vasculature in the left hemipelvis  Correlate clinically for pelvic varices and/or pelvic   congestion syndrome  There is no free fluid      IMPRESSION:     1    Thickening of the endometrial stripe for a premenopausal patient with suspected lesion in the endometrial canal measuring 2 4 x 1 5 x 1 5 cm  Top considerations would include endometrial polyp or submucosal leiomyoma  2   Ovaries are unremarkable with benign-appearing follicles  3   Ancillary findings discussed above        A/P: 41 y o  Hanss Tamara with Patient's last menstrual period was 10/08/2022 (exact date)  with heavy menses, endometrial mass  for D&C, hysteroscopy, resction of endometrial mass   The risks (infection, bleeding, transfusion, damage to adjacent organs requiring immediate and/or delayed repair, clots inside blood vessels, need to complete procedure using alternative approach, procedural failure, worsening of pre-exisiting medical condition, and death) and alternatives (see outpatient record) have been discussed and patient desires to proceed with D&C, hysteroscopy, resction of endometrial mass at [facility] on 11/17/2022       Consents reviewed and signed  Surgery packet reviewed with patient  All questions answered  Offered I&D of bartholin cyst in OR, but pt declines

## 2022-11-11 RX ORDER — ACETAMINOPHEN 325 MG/1
650 TABLET ORAL EVERY 6 HOURS PRN
COMMUNITY

## 2022-11-11 NOTE — PRE-PROCEDURE INSTRUCTIONS
Pre-Surgery Instructions:   Medication Instructions   • acetaminophen (TYLENOL) 325 mg tablet PRN   • cholecalciferol (VITAMIN D3) 400 units tablet Stop taking 7 days prior to surgery  • ferrous sulfate 325 (65 Fe) mg tablet Stop taking 7 days prior to surgery  • Multiple Vitamin (multivitamin) tablet Stop taking 7 days prior to surgery  My Surgical Experience    The following information was developed to assist you to prepare for your operation  What do I need to do before coming to the hospital?  • Arrange for a responsible person to drive you to and from the hospital   • Arrange care for your children at home  Children are not allowed in the recovery areas of the hospital  • Plan to wear clothing that is easy to put on and take off  If you are having shoulder surgery, wear a shirt that buttons or zippers in the front  Bathing  o Shower the evening before and the morning of your surgery with an antibacterial soap  Please refer to the Pre Op Showering Instructions for Surgery Patients Sheet   o Remove nail polish and all body piercing jewelry  o Do not shave any body part for at least 24 hours before surgery-this includes face, arms, legs and upper body  Food  o Nothing to eat or drink after midnight the night before your surgery  This includes candy and chewing gum  o Exception: If your surgery is after 12:00pm (noon), you may have clear liquids such as 7-Up®, ginger ale, apple or cranberry juice, Jell-O®, water, or clear broth until 8:00 am  o Do not drink milk or juice with pulp on the morning before surgery  o Do not drink alcohol 24 hours before surgery  Medicine  o Follow instructions you received from your surgeon about which medicines you may take on the day of surgery  o If instructed to take medicine on the morning of surgery, take pills with just a small sip of water   Call your prescribing doctor for specific infroamtion on what to do if you take insulin    What should I bring to the hospital?    Bring:  • Crutches or a walker, if you have them, for foot or knee surgery  • A list of the daily medicines, vitamins, minerals, herbals and nutritional supplements you take  Include the dosages of medicines and the time you take them each day  • Glasses, dentures or hearing aids  • Minimal clothing; you will be wearing hospital sleepwear  • Photo ID; required to verify your identity  • If you have a Living Will or Power of , bring a copy of the documents  • If you have an ostomy, bring an extra pouch and any supplies you use    Do not bring  • Medicines or inhalers  • Money, valuables or jewelry    What other information should I know about the day of surgery? • Notify your surgeons if you develop a cold, sore throat, cough, fever, rash or any other illness  • Report to the Ambulatory Surgical/Same Day Surgery Unit  • You will be instructed to stop at Registration only if you have not been pre-registered  • Inform your  fi they do not stay that they will be asked by the staff to leave a phone number where they can be reached  • Be available to be reached before surgery  In the event the operating room schedule changes, you may be asked to come in earlier or later than expected    *It is important to tell your doctor and others involved in your health care if you are taking or have been taking any non-prescription drugs, vitamins, minerals, herbals or other nutritional supplements   Any of these may interact with some food or medicines and cause a reaction

## 2022-11-16 ENCOUNTER — ANESTHESIA EVENT (OUTPATIENT)
Dept: PERIOP | Facility: HOSPITAL | Age: 47
End: 2022-11-16

## 2022-11-17 ENCOUNTER — ANESTHESIA (OUTPATIENT)
Dept: PERIOP | Facility: HOSPITAL | Age: 47
End: 2022-11-17

## 2022-11-17 ENCOUNTER — HOSPITAL ENCOUNTER (OUTPATIENT)
Facility: HOSPITAL | Age: 47
Setting detail: OUTPATIENT SURGERY
Discharge: HOME | End: 2022-11-17
Attending: OBSTETRICS & GYNECOLOGY | Admitting: OBSTETRICS & GYNECOLOGY

## 2022-11-17 VITALS
OXYGEN SATURATION: 100 % | DIASTOLIC BLOOD PRESSURE: 60 MMHG | SYSTOLIC BLOOD PRESSURE: 124 MMHG | RESPIRATION RATE: 15 BRPM | BODY MASS INDEX: 29.51 KG/M2 | HEART RATE: 90 BPM | TEMPERATURE: 98.7 F | WEIGHT: 174.6 LBS

## 2022-11-17 DIAGNOSIS — N94.89 ENDOMETRIAL MASS: ICD-10-CM

## 2022-11-17 DIAGNOSIS — N92.0 MENORRHAGIA WITH REGULAR CYCLE: ICD-10-CM

## 2022-11-17 PROBLEM — Z98.890 S/P DILATION AND CURETTAGE: Status: ACTIVE | Noted: 2022-11-17

## 2022-11-17 LAB
ERYTHROCYTE [DISTWIDTH] IN BLOOD BY AUTOMATED COUNT: 15.3 % (ref 11.6–15.1)
EXT PREGNANCY TEST URINE: NEGATIVE
EXT. CONTROL: NORMAL
HCT VFR BLD AUTO: 36.5 % (ref 34.8–46.1)
HGB BLD-MCNC: 12.1 G/DL (ref 11.5–15.4)
MCH RBC QN AUTO: 28.1 PG (ref 26.8–34.3)
MCHC RBC AUTO-ENTMCNC: 33.2 G/DL (ref 31.4–37.4)
MCV RBC AUTO: 85 FL (ref 82–98)
PLATELET # BLD AUTO: 336 THOUSANDS/UL (ref 149–390)
PMV BLD AUTO: 9.2 FL (ref 8.9–12.7)
RBC # BLD AUTO: 4.3 MILLION/UL (ref 3.81–5.12)
WBC # BLD AUTO: 7.12 THOUSAND/UL (ref 4.31–10.16)

## 2022-11-17 RX ORDER — LIDOCAINE HYDROCHLORIDE 20 MG/ML
INJECTION, SOLUTION EPIDURAL; INFILTRATION; INTRACAUDAL; PERINEURAL AS NEEDED
Status: DISCONTINUED | OUTPATIENT
Start: 2022-11-17 | End: 2022-11-17

## 2022-11-17 RX ORDER — ONDANSETRON 2 MG/ML
4 INJECTION INTRAMUSCULAR; INTRAVENOUS ONCE AS NEEDED
Status: DISCONTINUED | OUTPATIENT
Start: 2022-11-17 | End: 2022-11-17 | Stop reason: HOSPADM

## 2022-11-17 RX ORDER — MAGNESIUM HYDROXIDE 1200 MG/15ML
LIQUID ORAL AS NEEDED
Status: DISCONTINUED | OUTPATIENT
Start: 2022-11-17 | End: 2022-11-17 | Stop reason: HOSPADM

## 2022-11-17 RX ORDER — PROMETHAZINE HYDROCHLORIDE 25 MG/ML
12.5 INJECTION, SOLUTION INTRAMUSCULAR; INTRAVENOUS ONCE AS NEEDED
Status: DISCONTINUED | OUTPATIENT
Start: 2022-11-17 | End: 2022-11-17 | Stop reason: HOSPADM

## 2022-11-17 RX ORDER — ALBUTEROL SULFATE 2.5 MG/3ML
2.5 SOLUTION RESPIRATORY (INHALATION) ONCE AS NEEDED
Status: DISCONTINUED | OUTPATIENT
Start: 2022-11-17 | End: 2022-11-17 | Stop reason: HOSPADM

## 2022-11-17 RX ORDER — HYDROMORPHONE HCL/PF 1 MG/ML
0.5 SYRINGE (ML) INJECTION
Status: DISCONTINUED | OUTPATIENT
Start: 2022-11-17 | End: 2022-11-17 | Stop reason: HOSPADM

## 2022-11-17 RX ORDER — SODIUM CHLORIDE 9 MG/ML
INJECTION, SOLUTION INTRAVENOUS AS NEEDED
Status: DISCONTINUED | OUTPATIENT
Start: 2022-11-17 | End: 2022-11-17 | Stop reason: HOSPADM

## 2022-11-17 RX ORDER — EPHEDRINE SULFATE 50 MG/ML
INJECTION INTRAVENOUS AS NEEDED
Status: DISCONTINUED | OUTPATIENT
Start: 2022-11-17 | End: 2022-11-17

## 2022-11-17 RX ORDER — FENTANYL CITRATE/PF 50 MCG/ML
25 SYRINGE (ML) INJECTION
Status: DISCONTINUED | OUTPATIENT
Start: 2022-11-17 | End: 2022-11-17 | Stop reason: HOSPADM

## 2022-11-17 RX ORDER — MIDAZOLAM HYDROCHLORIDE 2 MG/2ML
INJECTION, SOLUTION INTRAMUSCULAR; INTRAVENOUS AS NEEDED
Status: DISCONTINUED | OUTPATIENT
Start: 2022-11-17 | End: 2022-11-17

## 2022-11-17 RX ORDER — ONDANSETRON 2 MG/ML
4 INJECTION INTRAMUSCULAR; INTRAVENOUS EVERY 6 HOURS PRN
Status: CANCELLED | OUTPATIENT
Start: 2022-11-17

## 2022-11-17 RX ORDER — IBUPROFEN 600 MG/1
600 TABLET ORAL EVERY 6 HOURS PRN
Status: DISCONTINUED | OUTPATIENT
Start: 2022-11-17 | End: 2022-11-17 | Stop reason: HOSPADM

## 2022-11-17 RX ORDER — SODIUM CHLORIDE, SODIUM LACTATE, POTASSIUM CHLORIDE, CALCIUM CHLORIDE 600; 310; 30; 20 MG/100ML; MG/100ML; MG/100ML; MG/100ML
125 INJECTION, SOLUTION INTRAVENOUS CONTINUOUS
Status: DISCONTINUED | OUTPATIENT
Start: 2022-11-17 | End: 2022-11-17 | Stop reason: HOSPADM

## 2022-11-17 RX ORDER — KETOROLAC TROMETHAMINE 30 MG/ML
INJECTION, SOLUTION INTRAMUSCULAR; INTRAVENOUS AS NEEDED
Status: DISCONTINUED | OUTPATIENT
Start: 2022-11-17 | End: 2022-11-17

## 2022-11-17 RX ORDER — ACETAMINOPHEN 325 MG/1
975 TABLET ORAL EVERY 6 HOURS PRN
Status: DISCONTINUED | OUTPATIENT
Start: 2022-11-17 | End: 2022-11-17 | Stop reason: HOSPADM

## 2022-11-17 RX ORDER — PROPOFOL 10 MG/ML
INJECTION, EMULSION INTRAVENOUS AS NEEDED
Status: DISCONTINUED | OUTPATIENT
Start: 2022-11-17 | End: 2022-11-17

## 2022-11-17 RX ORDER — DEXAMETHASONE SODIUM PHOSPHATE 10 MG/ML
INJECTION, SOLUTION INTRAMUSCULAR; INTRAVENOUS AS NEEDED
Status: DISCONTINUED | OUTPATIENT
Start: 2022-11-17 | End: 2022-11-17

## 2022-11-17 RX ORDER — ONDANSETRON 2 MG/ML
INJECTION INTRAMUSCULAR; INTRAVENOUS AS NEEDED
Status: DISCONTINUED | OUTPATIENT
Start: 2022-11-17 | End: 2022-11-17

## 2022-11-17 RX ORDER — FENTANYL CITRATE 50 UG/ML
INJECTION, SOLUTION INTRAMUSCULAR; INTRAVENOUS AS NEEDED
Status: DISCONTINUED | OUTPATIENT
Start: 2022-11-17 | End: 2022-11-17

## 2022-11-17 RX ADMIN — KETOROLAC TROMETHAMINE 30 MG: 30 INJECTION, SOLUTION INTRAMUSCULAR at 14:18

## 2022-11-17 RX ADMIN — PROPOFOL 100 MG: 10 INJECTION, EMULSION INTRAVENOUS at 14:11

## 2022-11-17 RX ADMIN — SODIUM CHLORIDE, SODIUM LACTATE, POTASSIUM CHLORIDE, AND CALCIUM CHLORIDE 125 ML/HR: .6; .31; .03; .02 INJECTION, SOLUTION INTRAVENOUS at 15:33

## 2022-11-17 RX ADMIN — MIDAZOLAM 2 MG: 1 INJECTION INTRAMUSCULAR; INTRAVENOUS at 12:45

## 2022-11-17 RX ADMIN — FENTANYL CITRATE 100 MCG: 50 INJECTION INTRAMUSCULAR; INTRAVENOUS at 13:01

## 2022-11-17 RX ADMIN — EPHEDRINE SULFATE 5 MG: 50 INJECTION, SOLUTION INTRAVENOUS at 14:03

## 2022-11-17 RX ADMIN — EPHEDRINE SULFATE 5 MG: 50 INJECTION, SOLUTION INTRAVENOUS at 13:21

## 2022-11-17 RX ADMIN — LIDOCAINE HYDROCHLORIDE 100 MG: 20 INJECTION, SOLUTION EPIDURAL; INFILTRATION; INTRACAUDAL; PERINEURAL at 12:56

## 2022-11-17 RX ADMIN — EPHEDRINE SULFATE 10 MG: 50 INJECTION, SOLUTION INTRAVENOUS at 13:15

## 2022-11-17 RX ADMIN — PROPOFOL 200 MG: 10 INJECTION, EMULSION INTRAVENOUS at 12:56

## 2022-11-17 RX ADMIN — SODIUM CHLORIDE, SODIUM LACTATE, POTASSIUM CHLORIDE, AND CALCIUM CHLORIDE: .6; .31; .03; .02 INJECTION, SOLUTION INTRAVENOUS at 12:37

## 2022-11-17 RX ADMIN — FENTANYL CITRATE 100 MCG: 50 INJECTION INTRAMUSCULAR; INTRAVENOUS at 14:08

## 2022-11-17 RX ADMIN — ONDANSETRON 4 MG: 2 INJECTION INTRAMUSCULAR; INTRAVENOUS at 13:00

## 2022-11-17 RX ADMIN — DEXAMETHASONE SODIUM PHOSPHATE 10 MG: 10 INJECTION, SOLUTION INTRAMUSCULAR; INTRAVENOUS at 13:00

## 2022-11-17 RX ADMIN — EPHEDRINE SULFATE 20 MG: 50 INJECTION, SOLUTION INTRAVENOUS at 13:31

## 2022-11-17 RX ADMIN — FENTANYL CITRATE 100 MCG: 50 INJECTION INTRAMUSCULAR; INTRAVENOUS at 12:45

## 2022-11-17 NOTE — INTERVAL H&P NOTE
H&P reviewed  After examining the patient I find no changes in the patients condition since the H&P had been written      Vitals:    11/17/22 1148   BP: 143/90   Pulse: 64   Resp: 16   Temp: 99 °F (37 2 °C)   SpO2: 100%

## 2022-11-17 NOTE — OP NOTE
OPERATIVE REPORT  PATIENT NAME: Ibrahima Mandujano    :  1975  MRN: [id number]  Pt Location: [facility] 03    SURGERY DATE: 2022    Surgeon(s) and Role:     * Arnell Libman, MD - Primary     * Zahira Mensah MD - Assisting    Preop Diagnosis:  Menorrhagia with regular cycle [N92 0]  Endometrial mass [N94 89]    Post-Op Diagnosis Codes:     * Menorrhagia with regular cycle [N92 0]     * Endometrial mass [N94 89]    Procedure(s) (LRB):  D&C W/HYSTEROSCOPY (N/A)  HYSTEROSCOPY W/ RESECTION ENDOMETRIAL MASS WITH SYMPHION (N/A)    Specimen(s):  ID Type Source Tests Collected by Time Destination   1 : Endometrial mass Tissue Endometrium TISSUE EXAM Arnell Libman, MD 2022 1419    2 : Endometrial curretting Tissue Endometrium TISSUE EXAM Arnell Libman, MD 2022 1420        Estimated Blood Loss:   40 mL     Drains:  Straight Cath 100 mL     IV Fluids:   800 mL LR     Fluid Deficit:   800 mL     Anesthesia Type:   General, LMA     Operative Indications:  Menorrhagia with regular cycle [N92 0]  Endometrial mass [N94 89]    Operative Findings:   1  External genitalia with a right bartholin gland cyst  External genitalia otherwise grossly normal in appearence  No ulcerations, no lacerations, no lesions  2   Vagina and cervix were  grossly normal in appearance without any lacerations or lesions  3  Uterus sounded to 10 cm  4  Hysteroscopic examination revealed diffuse proliferative endometrial lining and polypoid like tissue  There were noted to be a large fibroid which encompassed the entire uterine cavity and emerged from the right uterine wall     Procedure and Technique:  The patient was taken to the operating room where a time out was performed to confirm correct patient and correct procedure  General LMA anesthesia was adequately established  The patient was then placed in the dorsal lithotomy position in Yellow Fin stirrups    Pressure points were padded and a Phani hugger was placed to maintain control of core body temperature  She was prepped with chlorhexidine and draped in the usual sterile fashion  The bladder was straight cathed of approximately 100 cc of clear/yellow urine  A Godwin retractor was inserted into the vagina and the anterior lip of the cervix was visualized and grasped with a single toothed tenaculum  Uterus was sounded to 10 cm  Using Brayan dilators, the cervix was progressively dilated to about 20 [place]  A 6 3 mm 0 degree SYMPHION Hysteroscope was inserted  Upon entry into the cervical canal, there were noted to be diffuse polypoid like tissue  Upon entry into the uterine cavity, there was noted to be a large fibroid noted to encompass the entire uterine cavity emerging from the right uterine wall  Symphion tissue removal system was inserted and advanced into the endometrial cavity  Part of the noted fibroid was resected under direct visualization  Good hemostasis was noted  There was no evidence of uterine perforation  The hysteroscope was removed  All instrument counts were correct at the end of procedure  The patient tolerated the procedure well  She was awakened and taken to the PACU in stable condition  Complications:   None Apparent     Patient Disposition:  PACU         SIGNATURE: Jossy Cisneros MD  DATE: November 17, 2022  TIME: 2:40 PM    I agree with the operative report as dictated by Dr Padmaja Wilkerson and edited by me  I was present for and participated in the entire procedure      Doc MD Slim

## 2022-11-17 NOTE — ANESTHESIA POSTPROCEDURE EVALUATION
Post-Op Assessment Note    CV Status:  Stable    Pain management: adequate     Mental Status:  Awake   Hydration Status:  Stable   PONV Controlled:  None   Airway Patency:  Patent      Post Op Vitals Reviewed: Yes      Staff: Anesthesiologist         No notable events documented      BP      Temp      Pulse     Resp     SpO2      /55   Pulse 78   Temp 98 7 °F (37 1 °C)   Resp (!) 11   Wt 79 2 kg (174 lb 9 7 oz)   SpO2 98%   BMI 29 51 kg/m²

## 2022-11-17 NOTE — ANESTHESIA PREPROCEDURE EVALUATION
Procedure:  D&C W/HYSTEROSCOPY (Uterus)  HYSTEROSCOPY W/ RESECTION UTERINE TUMOR/FIBROID (Uterus)    Relevant Problems   HEMATOLOGY   (+) Anemia      Other   (+) BMI 28 0-28 9,adult        Physical Exam    Airway    Mallampati score: I  TM Distance: >3 FB  Neck ROM: full     Dental   No notable dental hx     Cardiovascular  Rhythm: regular, Rate: normal,     Pulmonary  Breath sounds clear to auscultation,     Other Findings        Anesthesia Plan  ASA Score- 1     Anesthesia Type- general with ASA Monitors  Additional Monitors:   Airway Plan: LMA  Plan Factors-Exercise tolerance (METS): >4 METS  Chart reviewed  Patient is not a current smoker  Obstructive sleep apnea risk education given perioperatively  Induction- intravenous  Postoperative Plan-     Informed Consent- Anesthetic plan and risks discussed with patient

## 2022-11-29 ENCOUNTER — OFFICE VISIT (OUTPATIENT)
Dept: OBGYN CLINIC | Facility: CLINIC | Age: 47
End: 2022-11-29

## 2022-11-29 VITALS
WEIGHT: 175 LBS | BODY MASS INDEX: 29.16 KG/M2 | SYSTOLIC BLOOD PRESSURE: 122 MMHG | DIASTOLIC BLOOD PRESSURE: 78 MMHG | HEIGHT: 65 IN

## 2022-11-29 DIAGNOSIS — D50.0 ANEMIA DUE TO CHRONIC BLOOD LOSS: ICD-10-CM

## 2022-11-29 DIAGNOSIS — Z98.890 S/P DILATION AND CURETTAGE: Primary | ICD-10-CM

## 2022-11-29 NOTE — PROGRESS NOTES
Patient is a 41 y o  Srinivasa Odell with Patient's last menstrual period was 11/27/2022  who presents for postoperative examination s/p D&C, hysteroscopy, resection of endometrial mass on 11/17/2022  Pt reports overall she feels well  She denies any postoperative pain  She reports some spotting and then she started her menses, which thus far is lighter than her typical menses  Reviewed images from surgery with patient (see in media) and reviewed pathology which showed benign endometrium, endometrial polyp and submucosal myoma  Reviewed with patient I removed all of the myoma I could see, but if there is still a portion in the myometrium it could involute into the uterine cavity  She will monitor her menses and let me know if they become heavy again  Will check CBC next month  Orders placed  Past Medical History:   Diagnosis Date   • Benign neoplasm parotid gland     LEFT SUPERFICIAL PAROTID GLAND, BENIGN   • Chlamydia     21-ROUTINE SCREEN   • Depression     NO MEDS- DR Vee Bloodgood   • Gonorrhea     25- ROUTINE SCREEN   • Pap smear for cervical cancer screening     5/2016-wnl, HRHPV neg; 4/2021-wnl, HRHPV neg   • Simple ovarian cyst 10/2010    RIGHT, SIMPLE [id number] MM  STABLE THROUGH AT LEAST 9/13   • Varicella        Past Surgical History:   Procedure Laterality Date   • BREAST BIOPSY      2011: RIGHT BREAST, BENIGN  DR Martinez [place]; 5/15: LEFT BREAST, BENIGN  HEMATOMA P-OP 9/15; 11/15: LEFT BENIGN CYST AND WALL; 9/15: LEFT AS REACCUMULATED AND NOW COMPOUND CYSTIC STRUCTURE   • BREAST BIOPSY      2003/04: MULTIPLE, BENIGN DR VILLALTA; 5/15: LEFT BENIGN   • BREAST LUMPECTOMY      L breast x 1 and x 1 on right   • HYSTEROSCOPY WITH  RESECTION UTERINE TUMOR/FIBROID N/A 11/17/2022    Procedure: HYSTEROSCOPY W/ RESECTION ENDOMETRIAL MASS WITH SYMPHION;  Surgeon: Shelley Ochoa MD;  Location: [facility];  Service: Gynecology   • KNEE SURGERY Left    • DC HYSTEROSCOPY,W/ENDO BX N/A 11/17/2022    Procedure: D&C W/HYSTEROSCOPY;  Surgeon: Tye Dejesus MD;  Location: [facility];  Service: Gynecology   • REPAIR ANKLE LIGAMENT Left     I ANKLE BONE SPUR   • SALIVARY GLAND SURGERY Left     EXCISION OF PARATID TUMOR/GLAND; PAROTID (SUPERFICIAL), BENIGN   • SHOULDER SURGERY Right    • WISDOM TOOTH EXTRACTION         OB History    Para Term  AB Living   0 0 0 0 0 0   SAB IAB Ectopic Multiple Live Births   0 0 0 0 0   Obstetric Comments   Menarche: 14      Menses: 22-26/7-8/spotting for 2-3 days then overnight pad every 1-2 hours for 4 days           Current Outpatient Medications:   •  acetaminophen (TYLENOL) 325 mg tablet, Take 650 mg by mouth every 6 (six) hours as needed for mild pain, Disp: , Rfl:     No Known Allergies    Social History     Socioeconomic History   • Marital status: /Civil Union     Spouse name: Jordi Camacho   • Number of children: 0   • Years of education: Not on file   • Highest education level: High school graduate   Occupational History   • Occupation: Security   Tobacco Use   • Smoking status: Former     Packs/day: 1 00     Types: Cigarettes   • Smokeless tobacco: Never   • Tobacco comments:     [age] yo    Vaping Use   • Vaping Use: Never used   Substance and Sexual Activity   • Alcohol use: No   • Drug use: No   • Sexual activity: Yes     Partners: Male     Birth control/protection: Condom Male     Comment: lifetime partners: 4;     Other Topics Concern   • Not on file   Social History Narrative    Confucianism: no preference    Accepts blood products                Exercise:  Farm work daily    Calcium: 1 yogurt everyday, 1 cheese everyday     Social Determinants of Health     Financial Resource Strain: Not on file   Food Insecurity: Not on file   Transportation Needs: Not on file   Physical Activity: Not on file   Stress: Not on file   Social Connections: Not on file   Intimate Partner Violence: Not on file   Housing Stability: Not on file       Family History   Problem Relation Age of Onset   • Depression Mother    • Hyperlipidemia Mother    • Hypertension Mother    • No Known Problems Father         estranged   • No Known Problems Sister         estranged   • No Known Problems Brother         estranged   • Coronary artery disease Maternal Grandmother    • Cervical cancer Maternal Grandmother         IN 50'S   • Diabetes type II Maternal Grandmother    • Heart failure Maternal Grandmother    • No Known Problems Maternal Grandfather         unsure   • No Known Problems Maternal Aunt    • No Known Problems Maternal Aunt    • No Known Problems Maternal Aunt    • No Known Problems Paternal Aunt    • No Known Problems Paternal Aunt    • No Known Problems Paternal Aunt    • Diabetes type II Other    • Breast cancer Neg Hx    • Ovarian cancer Neg Hx    • Colon cancer Neg Hx        Review of Systems   Constitutional: Positive for fatigue (during menses, but much improved compared to prior menses)  Negative for chills, fever and unexpected weight change  HENT: Negative for congestion, mouth sores and sore throat  Respiratory: Negative for cough, chest tightness, shortness of breath and wheezing  Cardiovascular: Negative for chest pain and palpitations  Gastrointestinal: Negative for abdominal distention, abdominal pain, constipation, diarrhea, nausea and vomiting  Endocrine: Negative for cold intolerance and heat intolerance  Genitourinary: Positive for vaginal bleeding  Negative for dyspareunia, dysuria, genital sores, menstrual problem, pelvic pain, vaginal discharge and vaginal pain  Musculoskeletal: Negative for arthralgias  Skin: Negative for color change and rash  Neurological: Negative for dizziness, light-headedness and headaches  Hematological: Negative for adenopathy  Blood pressure 122/78, height 5' 4 5" (1 638 m), weight 79 4 kg (175 lb), last menstrual period 11/27/2022, not currently breastfeeding  and Body mass index is 29 57 kg/m²      Physical Exam  Constitutional:       General: She is not in acute distress  Appearance: Normal appearance  She is well-developed, normal weight and well-nourished  She is not ill-appearing  HENT:      Head: Normocephalic and atraumatic  Eyes:      Extraocular Movements: EOM normal       Conjunctiva/sclera: Conjunctivae normal    Neck:      Thyroid: No thyromegaly  Trachea: No tracheal deviation  Pulmonary:      Effort: Pulmonary effort is normal    Abdominal:      General: There is no distension  Palpations: Abdomen is soft  There is no mass  Tenderness: There is no abdominal tenderness  There is no guarding or rebound  Hernia: No hernia is present  Musculoskeletal:         General: No tenderness or edema  Normal range of motion  Cervical back: Normal range of motion  Skin:     General: Skin is warm  Findings: No erythema or rash  Neurological:      Mental Status: She is alert and oriented to person, place, and time  Psychiatric:         Mood and Affect: Mood and affect normal          Behavior: Behavior normal          Thought Content: Thought content normal          Judgment: Judgment normal           vulva: normal external genitalia for age and no lesions, masses, epithelial changes, or exudate  vagina: color pink, rugae  well formed rugae and bleeding  with a small amount of bleeding  cervix: parous and no lesions   uterus: NSSC, AF, NT, mobile  adnexa: no masses or tenderness      A/P:  Pt is a 41 y o   with      Radha Kaminski was seen today for post-op  Diagnoses and all orders for this visit:    S/P dilation and curettage  -doing well postoperatively  -pathology benign--submucosal fibroid and polyps  -will observe menstrual cycles and see if flow is improved  Anemia due to chronic blood loss  -     CBC;  Future

## 2022-12-27 ENCOUNTER — APPOINTMENT (OUTPATIENT)
Dept: LAB | Facility: CLINIC | Age: 47
End: 2022-12-27

## 2022-12-27 DIAGNOSIS — D50.0 ANEMIA DUE TO CHRONIC BLOOD LOSS: ICD-10-CM

## 2022-12-27 LAB
ERYTHROCYTE [DISTWIDTH] IN BLOOD BY AUTOMATED COUNT: 13.1 % (ref 11.6–15.1)
HCT VFR BLD AUTO: 36.9 % (ref 34.8–46.1)
HGB BLD-MCNC: 11.9 G/DL (ref 11.5–15.4)
MCH RBC QN AUTO: 28.3 PG (ref 26.8–34.3)
MCHC RBC AUTO-ENTMCNC: 32.2 G/DL (ref 31.4–37.4)
MCV RBC AUTO: 88 FL (ref 82–98)
PLATELET # BLD AUTO: 367 THOUSANDS/UL (ref 149–390)
PMV BLD AUTO: 10.7 FL (ref 8.9–12.7)
RBC # BLD AUTO: 4.21 MILLION/UL (ref 3.81–5.12)
WBC # BLD AUTO: 8.36 THOUSAND/UL (ref 4.31–10.16)

## 2022-12-31 ENCOUNTER — HOSPITAL ENCOUNTER (OUTPATIENT)
Dept: MAMMOGRAPHY | Facility: HOSPITAL | Age: 47
Discharge: HOME | End: 2022-12-31

## 2022-12-31 VITALS — WEIGHT: 175 LBS | HEIGHT: 65 IN | BODY MASS INDEX: 29.16 KG/M2

## 2022-12-31 DIAGNOSIS — Z12.31 VISIT FOR SCREENING MAMMOGRAM: ICD-10-CM

## 2023-02-03 ENCOUNTER — OFFICE VISIT (OUTPATIENT)
Dept: URGENT CARE | Facility: CLINIC | Age: 48
End: 2023-02-03

## 2023-02-03 VITALS
TEMPERATURE: 98.3 F | DIASTOLIC BLOOD PRESSURE: 64 MMHG | HEART RATE: 60 BPM | OXYGEN SATURATION: 100 % | SYSTOLIC BLOOD PRESSURE: 135 MMHG

## 2023-02-03 DIAGNOSIS — L60.0 INGROWN NAIL OF GREAT TOE OF RIGHT FOOT: Primary | ICD-10-CM

## 2023-02-03 NOTE — PROGRESS NOTES
[facility]    NAME: Neelam Ruby is a 41 y o  female  : 1975    MRN: [id number]  DATE: February 3, 2023  TIME: 4:23 PM    Assessment and Plan   Ingrown nail of great toe of right foot [L60 0]  1  Ingrown nail of great toe of right foot            Patient Instructions     Patient Instructions   Follow up with podiatry      Chief Complaint   No chief complaint on file  History of Present Illness   56-year-old female here with complaint of bump on her right great toe  Noticed it last night  Denies any pain  States that it looks a little discolored and white  No redness  No signs of infection  Review of Systems   Review of Systems   Constitutional: Negative for chills and fever  Skin: Positive for color change  Bump on right great toe       Current Medications     Current Outpatient Medications:   •  acetaminophen (TYLENOL) 325 mg tablet, Take 650 mg by mouth every 6 (six) hours as needed for mild pain, Disp: , Rfl:     Current Allergies     Allergies as of 2023   • (No Known Allergies)          The following portions of the patient's history were reviewed and updated as appropriate: allergies, current medications, past family history, past medical history, past social history, past surgical history and problem list    Past Medical History:   Diagnosis Date   • Benign neoplasm parotid gland     LEFT SUPERFICIAL PAROTID GLAND, BENIGN   • Chlamydia     21-ROUTINE SCREEN   • Depression     NO MEDS- DR Lety Camacho   • Gonorrhea     21- ROUTINE SCREEN   • Pap smear for cervical cancer screening     2016-wnl, HRHPV neg; 2021-wnl, HRHPV neg   • Simple ovarian cyst 10/2010    RIGHT, SIMPLE [id number] MM  STABLE THROUGH AT LEAST    • Varicella      Past Surgical History:   Procedure Laterality Date   • BREAST BIOPSY      : RIGHT BREAST, BENIGN  DR Adrianna Coronado; 5/15: LEFT BREAST, BENIGN   HEMATOMA P-OP 9/15; 11/15: LEFT BENIGN CYST AND WALL; 9/15: LEFT AS REACCUMULATED AND NOW COMPOUND CYSTIC STRUCTURE   • BREAST BIOPSY      2003/04: MULTIPLE, BENIGN DR Misael Hernandez; 5/15: LEFT BENIGN   • BREAST CYST EXCISION Bilateral     benign   • HYSTEROSCOPY WITH  RESECTION UTERINE TUMOR/FIBROID N/A 11/17/2022    Procedure: HYSTEROSCOPY W/ RESECTION ENDOMETRIAL MASS WITH SYMPHION;  Surgeon: Emma Clemens MD;  Location[facility];  Service: Gynecology   • KNEE SURGERY Left    • SD HYSTEROSCOPY BX ENDOMETRIUM&/POLYPC W/WO D&C N/A 11/17/2022    Procedure: D&C W/HYSTEROSCOPY;  Surgeon: Emma Clemens MD;  Location[facility];  Service: Gynecology   • REPAIR ANKLE LIGAMENT Left     I ANKLE BONE SPUR   • SALIVARY GLAND SURGERY Left 2012    EXCISION OF PARATID TUMOR/GLAND; PAROTID (SUPERFICIAL), BENIGN   • SHOULDER SURGERY Right    • WISDOM TOOTH EXTRACTION       Family History   Problem Relation Age of Onset   • Depression Mother    • Hyperlipidemia Mother    • Hypertension Mother    • No Known Problems Father         estranged   • No Known Problems Sister         estranged   • No Known Problems Brother         estranged   • Coronary artery disease Maternal Grandmother    • Cervical cancer Maternal Grandmother         IN 50'S   • Diabetes type II Maternal Grandmother    • Heart failure Maternal Grandmother    • No Known Problems Maternal Grandfather         unsure   • No Known Problems Maternal Aunt    • No Known Problems Maternal Aunt    • No Known Problems Maternal Aunt    • No Known Problems Paternal Aunt    • No Known Problems Paternal Aunt    • No Known Problems Paternal Aunt    • Diabetes type II Other    • Breast cancer Neg Hx    • Ovarian cancer Neg Hx    • Colon cancer Neg Hx      Social History     Socioeconomic History   • Marital status: /Civil Union     Spouse name: Abby Carrera   • Number of children: 0   • Years of education: Not on file   • Highest education level: High school graduate   Occupational History   • Occupation: Security   Tobacco Use   • Smoking status: Former     Packs/day: 1 00     Types: Cigarettes   • Smokeless tobacco: Never   • Tobacco comments:     [age] yo    Vaping Use   • Vaping Use: Never used   Substance and Sexual Activity   • Alcohol use: No   • Drug use: No   • Sexual activity: Yes     Partners: Male     Birth control/protection: Condom Male     Comment: lifetime partners: 3;  2005   Other Topics Concern   • Not on file   Social History Narrative    Mosque: no preference    Accepts blood products                Exercise: Farm work daily    Calcium: 1 yogurt everyday, 1 cheese everyday     Social Determinants of Health     Financial Resource Strain: Not on file   Food Insecurity: Not on file   Transportation Needs: Not on file   Physical Activity: Not on file   Stress: Not on file   Social Connections: Not on file   Intimate Partner Violence: Not on file   Housing Stability: Not on file     Medications have been verified  Objective   /64   Pulse 60   Temp 98 3 °F (36 8 °C)   SpO2 100%      Physical Exam   Physical Exam  Vitals and nursing note reviewed  Constitutional:       Appearance: Normal appearance  Cardiovascular:      Rate and Rhythm: Regular rhythm  Pulses: Normal pulses  Heart sounds: No murmur heard  Pulmonary:      Effort: Pulmonary effort is normal       Breath sounds: Normal breath sounds  Musculoskeletal:      Cervical back: Normal range of motion  Feet:    Feet:      Left foot:      Toenail Condition: Left toenails are ingrown  Comments: Cap refill less than 2 sec  Pulses +2 bilateral lower extremities  Neurological:      Mental Status: She is alert

## 2023-07-27 ENCOUNTER — ANNUAL EXAM (OUTPATIENT)
Dept: OBGYN CLINIC | Facility: CLINIC | Age: 48
End: 2023-07-27
Payer: COMMERCIAL

## 2023-07-27 ENCOUNTER — APPOINTMENT (OUTPATIENT)
Dept: LAB | Facility: CLINIC | Age: 48
End: 2023-07-27
Payer: COMMERCIAL

## 2023-07-27 VITALS
BODY MASS INDEX: 30.02 KG/M2 | HEIGHT: 65 IN | WEIGHT: 180.2 LBS | DIASTOLIC BLOOD PRESSURE: 74 MMHG | SYSTOLIC BLOOD PRESSURE: 122 MMHG

## 2023-07-27 DIAGNOSIS — D50.0 ANEMIA DUE TO CHRONIC BLOOD LOSS: ICD-10-CM

## 2023-07-27 DIAGNOSIS — Z12.31 VISIT FOR SCREENING MAMMOGRAM: ICD-10-CM

## 2023-07-27 DIAGNOSIS — Z01.419 ENCOUNTER FOR ANNUAL ROUTINE GYNECOLOGICAL EXAMINATION: Primary | ICD-10-CM

## 2023-07-27 DIAGNOSIS — Z12.11 COLON CANCER SCREENING: ICD-10-CM

## 2023-07-27 DIAGNOSIS — N64.4 MASTALGIA: ICD-10-CM

## 2023-07-27 DIAGNOSIS — E58 DIETARY CALCIUM DEFICIENCY: ICD-10-CM

## 2023-07-27 LAB
ERYTHROCYTE [DISTWIDTH] IN BLOOD BY AUTOMATED COUNT: 13.5 % (ref 11.6–15.1)
HCT VFR BLD AUTO: 37.4 % (ref 34.8–46.1)
HGB BLD-MCNC: 11.9 G/DL (ref 11.5–15.4)
MCH RBC QN AUTO: 26.9 PG (ref 26.8–34.3)
MCHC RBC AUTO-ENTMCNC: 31.8 G/DL (ref 31.4–37.4)
MCV RBC AUTO: 84 FL (ref 82–98)
PLATELET # BLD AUTO: 374 THOUSANDS/UL (ref 149–390)
PMV BLD AUTO: 10.6 FL (ref 8.9–12.7)
RBC # BLD AUTO: 4.43 MILLION/UL (ref 3.81–5.12)
WBC # BLD AUTO: 9 THOUSAND/UL (ref 4.31–10.16)

## 2023-07-27 PROCEDURE — 85027 COMPLETE CBC AUTOMATED: CPT

## 2023-07-27 PROCEDURE — 36415 COLL VENOUS BLD VENIPUNCTURE: CPT

## 2023-07-27 PROCEDURE — S0612 ANNUAL GYNECOLOGICAL EXAMINA: HCPCS | Performed by: OBSTETRICS & GYNECOLOGY

## 2023-07-27 NOTE — PROGRESS NOTES
Pt is a 41 y.o. Nicole Dad with Patient's last menstrual period was 2023 (exact date). using condoms (male) for [facility] presents for preventive care. She notes the same partner since her last STI evaluation. In her lifetime she has been involved with <5 partners . Safe sexual practices (monogomy, condoms) are followed consistently. · She does  feel safe in the relationship. She does feel safe in her home. · Her calcium intake encompasses cheese for a total of 1 servings daily on average. She does not take additional Vitamin D (MVI or supplement). · She exercises 6-7 times per week. · Her menses occur every 22-26 Days, last 4-5 days and require regular pad every 4-5 hours. Menstrual History:  OB History        0    Para   0    Term   0       0    AB   0    Living   0       SAB   0    IAB   0    Ectopic   0    Multiple   0    Live Births   0           Obstetric Comments   Menarche: 14    Menses: -//regular pad every 4-5 hours              Menarche age: 17  Patient's last menstrual period was 2023 (exact date). ·      · She has never recieved an HPV vaccine. · tobacco use : does not use tobacco              · Colonoscopy: never had, declines, agreeable to cologuard. · Mammogram: 2022-wnl, repeat rx for 1 year given  · Pap: 2021-wnl, HRHP Neg, repeat next year. 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 Collette Shelley   • Gonorrhea     23- ROUTINE SCREEN   • Pap smear for cervical cancer screening     2016-wnl, HRHPV neg; 2021-wnl, HRHPV neg   • Simple ovarian cyst 10/2010    RIGHT, SIMPLE [id number] MM. STABLE THROUGH AT LEAST    • Varicella        Past Surgical History:   Procedure Laterality Date   • BREAST BIOPSY      : RIGHT BREAST, BENIGN. DR. Dav Love; 5/15: LEFT BREAST, BENIGN.  HEMATOMA P-OP 9/15; 11/15: LEFT BENIGN CYST AND WALL; 9/15: LEFT AS REACCUMULATED AND NOW COMPOUND CYSTIC STRUCTURE   • BREAST BIOPSY      : MULTIPLE, BENIGN DR. Hardy Zelaya; 5/15: LEFT BENIGN   • BREAST CYST EXCISION Bilateral     benign   • HYSTEROSCOPY WITH  RESECTION UTERINE TUMOR/FIBROID N/A 2022    Procedure: HYSTEROSCOPY W/ RESECTION ENDOMETRIAL MASS WITH SYMPHION;  Surgeon: Roland Ribera MD;  [facility];  Service: Gynecology   • KNEE SURGERY Left    • CA HYSTEROSCOPY BX ENDOMETRIUM&/POLYPC W/WO D&C N/A 2022    Procedure: D&C W/HYSTEROSCOPY;  Surgeon: Roland Ribera MD;  Location: [facility];  Service: Gynecology   • REPAIR ANKLE LIGAMENT Left     I ANKLE BONE SPUR   • SALIVARY GLAND SURGERY Left     EXCISION OF PARATID TUMOR/GLAND; PAROTID (SUPERFICIAL), BENIGN   • SHOULDER SURGERY Right    • WISDOM TOOTH EXTRACTION         OB History    Para Term  AB Living   0 0 0 0 0 0   SAB IAB Ectopic Multiple Live Births   0 0 0 0 0   Obstetric Comments   Menarche: 14      Menses: 22-26/5/regular pad every 4-5 hours            Current Outpatient Medications:   •  acetaminophen (TYLENOL) 325 mg tablet, Take 650 mg by mouth every 6 (six) hours as needed for mild pain, Disp: , Rfl:     No Known Allergies    Social History     Socioeconomic History   • Marital status: /Civil Union     Spouse name: Yue Lafleur   • Number of children: 0   • Years of education: None   • Highest education level: High school graduate   Occupational History   • 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 [address] Use   • Smoking status: Former     Packs/day: 1.00     Types: Cigarettes   • Smokeless tobacco: Never   • Tobacco comments:     [age] yo    Vaping Use   • Vaping Use: Never used   Substance and Sexual Activity   • Alcohol use: No   • Drug use: No   • Sexual activity: Yes     Partners: Male     Birth control/protection: Condom Male     Comment: lifetime partners: 4;     Other Topics Concern   • None Social History Narrative    Confucianism: no preference    Accepts blood products                Exercise: Farm work daily    Calcium: 1 cheese everyday     Social Determinants of Health     Financial Resource Strain: Not on file   Food Insecurity: Not on file   Transportation Needs: Not on file   Physical Activity: Not on file   Stress: Not on file   Social Connections: Not on file   Intimate Partner Violence: Not on file   Housing Stability: Not on file       Family History   Problem Relation Age of Onset   • Depression Mother    • Hyperlipidemia Mother    • Hypertension Mother    • No Known Problems Father         estranged   • No Known Problems Sister         estranged   • No Known Problems Brother         estranged   • Coronary artery disease Maternal Grandmother    • Cervical cancer Maternal Grandmother         IN 50'S   • Diabetes type II Maternal Grandmother    • Heart failure Maternal Grandmother    • No Known Problems Maternal Grandfather         unsure   • No Known Problems Maternal Aunt    • No Known Problems Maternal Aunt    • No Known Problems Maternal Aunt    • No Known Problems Paternal Aunt    • No Known Problems Paternal Aunt    • No Known Problems Paternal Aunt    • Diabetes type II Other    • Breast cancer Neg Hx    • Ovarian cancer Neg Hx    • Colon cancer Neg Hx        Blood pressure 122/74, height 5' 4.5" (1.638 m), weight 81.7 kg (180 lb 3.2 oz), last menstrual period 07/26/2023, not currently breastfeeding. and Body mass index is 30.45 kg/m². Physical Exam  Constitutional:       General: She is not in acute distress. Appearance: Normal appearance. She is well-developed. She is obese. She is not ill-appearing. HENT:      Head: Normocephalic and atraumatic. Eyes:      Extraocular Movements: Extraocular movements intact. Conjunctiva/sclera: Conjunctivae normal.   Neck:      Thyroid: No thyromegaly. Trachea: No tracheal deviation.    Cardiovascular:      Rate and Rhythm: Normal rate and regular rhythm. Heart sounds: Normal heart sounds. Pulmonary:      Effort: Pulmonary effort is normal. No respiratory distress. Breath sounds: Normal breath sounds. No stridor. No wheezing or rales. Abdominal:      General: Bowel sounds are normal. There is no distension. Palpations: Abdomen is soft. There is no mass. Tenderness: There is no abdominal tenderness. There is no guarding or rebound. Hernia: No hernia is present. Musculoskeletal:         General: No tenderness. Normal range of motion. Cervical back: Normal range of motion and neck supple. Lymphadenopathy:      Cervical: No cervical adenopathy. Skin:     General: Skin is warm. Findings: No erythema or rash. Neurological:      Mental Status: She is alert and oriented to person, place, and time. Psychiatric:         Mood and Affect: Mood normal.         Behavior: Behavior normal.         Thought Content: Thought content normal.         Judgment: Judgment normal.         Breasts: breasts appear normal, no suspicious masses, no skin or nipple changes or axillary nodes, symmetric fibrous changes in both upper outer quadrants. vulva: normal external genitalia for age and no lesions, masses, epithelial changes, or exudate  vagina: color pink and rugae  well formed rugae  cervix: parous and no lesions   uterus: NSSC, AF, NT, mobile  adnexa: no masses or tenderness   Rectal: no masses      A/P:  Pt is a 41 y.o. Shanel Scott with      Kavitha Tobin was seen today for gynecologic exam.    Diagnoses and all orders for this visit:    Encounter for annual routine gynecological examination  -stable examination  -pap up to date    Visit for screening mammogram  -     Mammo screening bilateral w 3d & cad; Future    Colon cancer screening  -     Cologuard    Anemia due to chronic blood loss  -     CBC; Future    Mastalgia  -     US breast left limited (diagnostic);  Future    Dietary calcium deficiency  Patient advised recommendation of daily dietary calcium of 1000 mg calcium. BMI 30.0-30.9,adult  Patient advised recommendation of BMI to be between 19-25.

## 2023-08-23 LAB — COLOGUARD RESULT REPORTABLE: NEGATIVE

## 2023-08-28 ENCOUNTER — TELEPHONE (OUTPATIENT)
Dept: OBGYN CLINIC | Facility: CLINIC | Age: 48
End: 2023-08-28

## 2023-08-28 NOTE — TELEPHONE ENCOUNTER
----- Message from Norberto Phipps MD sent at 8/25/2023  7:03 PM EDT -----  Please notify the patient that her cologuard screening is negative.

## 2024-01-23 ENCOUNTER — HOSPITAL ENCOUNTER (OUTPATIENT)
Dept: ULTRASOUND IMAGING | Facility: HOSPITAL | Age: 49
Discharge: HOME | End: 2024-01-23
Payer: COMMERCIAL

## 2024-01-23 ENCOUNTER — HOSPITAL ENCOUNTER (OUTPATIENT)
Dept: MAMMOGRAPHY | Facility: HOSPITAL | Age: 49
Discharge: HOME | End: 2024-01-23
Payer: COMMERCIAL

## 2024-01-23 DIAGNOSIS — N64.4 MASTALGIA: ICD-10-CM

## 2024-01-23 PROCEDURE — 77066 DX MAMMO INCL CAD BI: CPT

## 2024-01-23 PROCEDURE — G0279 TOMOSYNTHESIS, MAMMO: HCPCS

## 2024-01-23 PROCEDURE — 76642 ULTRASOUND BREAST LIMITED: CPT

## 2024-01-26 ENCOUNTER — TELEPHONE (OUTPATIENT)
Dept: OBGYN CLINIC | Facility: CLINIC | Age: 49
End: 2024-01-26

## 2024-01-26 DIAGNOSIS — R92.8 ABNORMALITY OF BOTH BREASTS ON SCREENING MAMMOGRAM: Primary | ICD-10-CM

## 2024-01-26 NOTE — TELEPHONE ENCOUNTER
Called pt. Pt made aware of Ann reviewing US and to repeat diagnostic in 6 months and mammo in 1 year.

## 2024-01-26 NOTE — PROGRESS NOTES
Please notify the patient that I have received her diagnostic imaging of her breasts. Radiology recommends repeat u/s in 6 months and diagnostic b/l mammogram in 1 year. Both orders have been placed so she can schedule accordingly.

## 2024-02-21 PROBLEM — Z01.419 ENCOUNTER FOR ANNUAL ROUTINE GYNECOLOGICAL EXAMINATION: Status: RESOLVED | Noted: 2018-02-12 | Resolved: 2024-02-21

## 2024-07-31 ENCOUNTER — ANNUAL EXAM (OUTPATIENT)
Dept: OBGYN CLINIC | Facility: CLINIC | Age: 49
End: 2024-07-31
Payer: COMMERCIAL

## 2024-07-31 VITALS
BODY MASS INDEX: 29.99 KG/M2 | HEIGHT: 65 IN | SYSTOLIC BLOOD PRESSURE: 148 MMHG | WEIGHT: 180 LBS | DIASTOLIC BLOOD PRESSURE: 82 MMHG

## 2024-07-31 DIAGNOSIS — Z01.419 ENCOUNTER FOR ANNUAL ROUTINE GYNECOLOGICAL EXAMINATION: Primary | ICD-10-CM

## 2024-07-31 DIAGNOSIS — E58 DIETARY CALCIUM DEFICIENCY: ICD-10-CM

## 2024-07-31 DIAGNOSIS — Z12.4 PAP SMEAR FOR CERVICAL CANCER SCREENING: ICD-10-CM

## 2024-07-31 PROBLEM — Z20.2 EXPOSURE TO SEXUALLY TRANSMITTED DISEASE (STD): Status: RESOLVED | Noted: 2018-02-12 | Resolved: 2024-07-31

## 2024-07-31 PROBLEM — Z20.828 EXPOSURE TO VIRAL DISEASE: Status: RESOLVED | Noted: 2018-02-12 | Resolved: 2024-07-31

## 2024-07-31 PROCEDURE — S0612 ANNUAL GYNECOLOGICAL EXAMINA: HCPCS | Performed by: OBSTETRICS & GYNECOLOGY

## 2024-07-31 PROCEDURE — G0476 HPV COMBO ASSAY CA SCREEN: HCPCS | Performed by: OBSTETRICS & GYNECOLOGY

## 2024-07-31 PROCEDURE — G0145 SCR C/V CYTO,THINLAYER,RESCR: HCPCS | Performed by: OBSTETRICS & GYNECOLOGY

## 2024-07-31 NOTE — PROGRESS NOTES
Pt is a 48 y.o.  with Patient's last menstrual period was 2024 (exact date). using condoms (male) for BC presents for preventive care.   She notes the same partner since her last STI evaluation. In her lifetime she has been involved with <5 partners .   Safe sexual practices (monogomy, condoms) are followed consistently.         She does  feel safe in the relationship.  She does feel safe in her home.    Her calcium intake encompasses milk (cow, goat, almond, cashew, soy, etc) and cheese for a total of 3-4 servings daily on average.  She does not take additional Vitamin D (MVI or supplement).    She exercises 4-5 times per week.  Her menses occur every 22-26 Days, last 4-5 days and require regular pad every 4-5 hours.  Menstrual History:  OB History          0    Para   0    Term   0       0    AB   0    Living   0         SAB   0    IAB   0    Ectopic   0    Multiple   0    Live Births   0           Obstetric Comments   Menarche: 14    Menses: -/5/regular pad every 4-5 hours                Menarche age: 14  Patient's last menstrual period was 2024 (exact date).          She has never recieved an HPV vaccine.  tobacco use : does not use tobacco              Colonoscopy: declines, cologuard 2023-negative, repeat   Mammogram: 2024-diagnostic bilateral mammogram with left ultrasound--multiple masses b/l consistent with cysts, Left with probable complicated cysts, recommend 6 mo f/u u/s of left breast with diagnostic b.l mammogram in 1 year. Orders previously placed.   Pap: 4/15/2021-wnl, HRHPV neg, repeat collected today    Past Medical History:   Diagnosis Date    Benign neoplasm parotid gland     LEFT SUPERFICIAL PAROTID GLAND, BENIGN    Chlamydia     19-ROUTINE SCREEN    Colon cancer screening     2023-negative    Depression     NO MEDS- DR. PERDOMO    Gonorrhea     19- ROUTINE SCREEN    Pap smear for cervical cancer screening     2016-wnl, HRHPV neg; 2021-wnl,  HRHPV neg; 2024-    Simple ovarian cyst 10/2010    RIGHT, SIMPLE [id number] MM. STABLE THROUGH AT LEAST     Varicella        Past Surgical History:   Procedure Laterality Date    BREAST BIOPSY      : RIGHT BREAST, BENIGN. DR. VILLALTA; 5/15: LEFT BREAST, BENIGN. HEMATOMA P-OP 9/15; 11/15: LEFT BENIGN CYST AND WALL; 9/15: LEFT AS REACCUMULATED AND NOW COMPOUND CYSTIC STRUCTURE    BREAST BIOPSY      : MULTIPLE, BENIGN DR. VILLALTA; 5/15: LEFT BENIGN    BREAST CYST EXCISION Bilateral     benign    HYSTEROSCOPY WITH  RESECTION UTERINE TUMOR/FIBROID N/A 2022    Procedure: HYSTEROSCOPY W/ RESECTION ENDOMETRIAL MASS WITH SYMPHION;  Surgeon: Cathy Juarez MD;  [facility];  Service: Gynecology    KNEE SURGERY Left     AR HYSTEROSCOPY BX ENDOMETRIUM&/POLYPC W/WO D&C N/A 2022    Procedure: D&C W/HYSTEROSCOPY;  Surgeon: Cathy Juarez MD;  [facility];  Service: Gynecology    REPAIR ANKLE LIGAMENT Left     I ANKLE BONE SPUR    SALIVARY GLAND SURGERY Left 2012    EXCISION OF PARATID TUMOR/GLAND; PAROTID (SUPERFICIAL), BENIGN    SHOULDER SURGERY Right     WISDOM TOOTH EXTRACTION         OB History    Para Term  AB Living   0 0 0 0 0 0   SAB IAB Ectopic Multiple Live Births   0 0 0 0 0   Obstetric Comments   Menarche: 14      Menses: 22-26/5/regular pad every 4-5 hours            Current Outpatient Medications:     acetaminophen (TYLENOL) 325 mg tablet, Take 650 mg by mouth every 6 (six) hours as needed for mild pain, Disp: , Rfl:     No Known Allergies    Social History     Socioeconomic History    Marital status: /Civil Union     Spouse name: Miles    Number of children: 0    Years of education: None    Highest education level: High school graduate   Occupational History    Occupation:    Tobacco Use    Smoking status: Former     Current packs/day: 1.00     Types: Cigarettes    Smokeless tobacco: Never    Tobacco comments:     [age] yo   "  Vaping Use    Vaping status: Never Used   Substance and Sexual Activity    Alcohol use: No    Drug use: No    Sexual activity: Yes     Partners: Male     Birth control/protection: Condom Male     Comment: lifetime partners: 4;  2005   Other Topics Concern    None   Social History Narrative    Jew: no preference    Accepts blood products                Exercise: Farm work daily    Calcium: 1 cheese everyday, 1 c almond milk daily,      Social Determinants of Health     Financial Resource Strain: Not on file   Food Insecurity: Not on file   Transportation Needs: Not on file   Physical Activity: Not on file   Stress: Not on file   Social Connections: Not on file   Intimate Partner Violence: Not on file   Housing Stability: Not on file       Family History   Problem Relation Age of Onset    Depression Mother     Hyperlipidemia Mother     Hypertension Mother     No Known Problems Father         estranged    No Known Problems Sister         estranged    No Known Problems Brother         estranged    Coronary artery disease Maternal Grandmother     Cervical cancer Maternal Grandmother         IN 50'S    Diabetes type II Maternal Grandmother     Heart failure Maternal Grandmother     No Known Problems Maternal Grandfather         unsure    No Known Problems Paternal Grandmother     No Known Problems Paternal Grandfather     No Known Problems Maternal Aunt     No Known Problems Maternal Aunt     No Known Problems Maternal Aunt     No Known Problems Paternal Aunt     No Known Problems Paternal Aunt     No Known Problems Paternal Aunt     Diabetes type II Other     Breast cancer Neg Hx     Ovarian cancer Neg Hx     Colon cancer Neg Hx        Blood pressure 148/82, height 5' 4.5\" (1.638 m), weight 81.6 kg (180 lb), last menstrual period 07/16/2024, not currently breastfeeding. and Body mass index is 30.42 kg/m².    Physical Exam  Constitutional:       Appearance: Normal appearance. She is well-developed.   HENT: "      Head: Normocephalic and atraumatic.   Eyes:      Extraocular Movements: Extraocular movements intact.      Conjunctiva/sclera: Conjunctivae normal.   Neck:      Thyroid: No thyromegaly.      Trachea: No tracheal deviation.   Cardiovascular:      Rate and Rhythm: Normal rate and regular rhythm.      Heart sounds: Normal heart sounds.   Pulmonary:      Effort: Pulmonary effort is normal. No respiratory distress.      Breath sounds: Normal breath sounds. No stridor. No wheezing or rales.   Abdominal:      General: Bowel sounds are normal. There is no distension.      Palpations: Abdomen is soft. There is no mass.      Tenderness: There is no abdominal tenderness. There is no guarding or rebound.      Hernia: No hernia is present.   Musculoskeletal:         General: No tenderness. Normal range of motion.      Cervical back: Normal range of motion and neck supple.   Lymphadenopathy:      Cervical: No cervical adenopathy.   Skin:     General: Skin is warm.      Findings: No erythema or rash.   Neurological:      Mental Status: She is alert and oriented to person, place, and time.   Psychiatric:         Mood and Affect: Mood normal.         Behavior: Behavior normal.         Thought Content: Thought content normal.         Judgment: Judgment normal.         Breasts: breasts appear normal, no suspicious masses, no skin or nipple changes or axillary nodes, symmetric fibrous changes in both upper outer quadrants.    vulva: normal external genitalia for age and right bartholin's cyst, stable  vagina: color pink and rugae  well formed rugae  cervix: nullip, no lesions , and pap obtained  uterus: NSSC, AF, NT, mobile  adnexa: no masses or tenderness  rectum: no masses or nodularity      A/P:  Pt is a 48 y.o.  with      Nellie was seen today for gynecologic exam.    Diagnoses and all orders for this visit:    Encounter for annual routine gynecological examination  -stable examination  -pap updated today  -pt advised  to schedule her diagnostic breast imaging which was ordered several months ago  -colon cancer screening up to date    Pap smear for cervical cancer screening  -     Liquid-based pap, screening    Dietary calcium deficiency  Patient advised recommendation of daily dietary calcium of 1000 mg calcium.     BMI 30.0-30.9,adult  Patient advised recommendation of BMI to be between 19-25.

## 2024-08-01 LAB
HPV HR 12 DNA CVX QL NAA+PROBE: NEGATIVE
HPV16 DNA CVX QL NAA+PROBE: NEGATIVE
HPV18 DNA CVX QL NAA+PROBE: NEGATIVE

## 2024-08-06 LAB
LAB AP GYN PRIMARY INTERPRETATION: NORMAL
Lab: NORMAL

## 2024-09-26 ENCOUNTER — OFFICE VISIT (OUTPATIENT)
Age: 49
End: 2024-09-26
Payer: COMMERCIAL

## 2024-09-26 VITALS
DIASTOLIC BLOOD PRESSURE: 60 MMHG | TEMPERATURE: 98.3 F | HEART RATE: 72 BPM | BODY MASS INDEX: 29.25 KG/M2 | SYSTOLIC BLOOD PRESSURE: 112 MMHG | HEIGHT: 66 IN | WEIGHT: 182 LBS | OXYGEN SATURATION: 98 %

## 2024-09-26 DIAGNOSIS — Z13.21 ENCOUNTER FOR VITAMIN DEFICIENCY SCREENING: ICD-10-CM

## 2024-09-26 DIAGNOSIS — Z86.39 HISTORY OF IRON DEFICIENCY: ICD-10-CM

## 2024-09-26 DIAGNOSIS — Z00.00 ANNUAL PHYSICAL EXAM: Primary | ICD-10-CM

## 2024-09-26 DIAGNOSIS — Z13.1 SCREENING FOR DIABETES MELLITUS: ICD-10-CM

## 2024-09-26 DIAGNOSIS — Z13.29 THYROID DISORDER SCREENING: ICD-10-CM

## 2024-09-26 DIAGNOSIS — Z12.83 SKIN CANCER SCREENING: ICD-10-CM

## 2024-09-26 DIAGNOSIS — Z13.220 LIPID SCREENING: ICD-10-CM

## 2024-09-26 PROCEDURE — 99386 PREV VISIT NEW AGE 40-64: CPT | Performed by: INTERNAL MEDICINE

## 2024-09-26 NOTE — PATIENT INSTRUCTIONS
"Patient Education     Routine physical for adults   The Basics   Written by the doctors and editors at [facility]   What is a physical? -- A physical is a routine visit, or \"check-up,\" with your doctor. You might also hear it called a \"wellness visit\" or \"preventive visit.\"  During each visit, the doctor will:   Ask about your physical and mental health   Ask about your habits, behaviors, and lifestyle   Do an exam   Give you vaccines if needed   Talk to you about any medicines you take   Give advice about your health   Answer your questions  Getting regular check-ups is an important part of taking care of your health. It can help your doctor find and treat any problems you have. But it's also important for preventing health problems.  A routine physical is different from a \"sick visit.\" A sick visit is when you see a doctor because of a health concern or problem. Since physicals are scheduled ahead of time, you can think about what you want to ask the doctor.  How often should I get a physical? -- It depends on your age and health. In general, for people age 21 years and older:   If you are younger than 50 years, you might be able to get a physical every 3 years.   If you are 50 years or older, your doctor might recommend a physical every year.  If you have an ongoing health condition, like diabetes or high blood pressure, your doctor will probably want to see you more often.  What happens during a physical? -- In general, each visit will include:   Physical exam - The doctor or nurse will check your height, weight, heart rate, and blood pressure. They will also look at your eyes and ears. They will ask about how you are feeling and whether you have any symptoms that bother you.   Medicines - It's a good idea to bring a list of all the medicines you take to each doctor visit. Your doctor will talk to you about your medicines and answer any questions. Tell them if you are having any side effects that bother you. You " "should also tell them if you are having trouble paying for any of your medicines.   Habits and behaviors - This includes:   Your diet   Your exercise habits   Whether you smoke, drink alcohol, or use drugs   Whether you are sexually active   Whether you feel safe at home  Your doctor will talk to you about things you can do to improve your health and lower your risk of health problems. They will also offer help and support. For example, if you want to quit smoking, they can give you advice and might prescribe medicines. If you want to improve your diet or get more physical activity, they can help you with this, too.   Lab tests, if needed - The tests you get will depend on your age and situation. For example, your doctor might want to check your:   Cholesterol   Blood sugar   Iron level   Vaccines - The recommended vaccines will depend on your age, health, and what vaccines you already had. Vaccines are very important because they can prevent certain serious or deadly infections.   Discussion of screening - \"Screening\" means checking for diseases or other health problems before they cause symptoms. Your doctor can recommend screening based on your age, risk, and preferences. This might include tests to check for:   Cancer, such as breast, prostate, cervical, ovarian, colorectal, prostate, lung, or skin cancer   Sexually transmitted infections, such as chlamydia and gonorrhea   Mental health conditions like depression and anxiety  Your doctor will talk to you about the different types of screening tests. They can help you decide which screenings to have. They can also explain what the results might mean.   Answering questions - The physical is a good time to ask the doctor or nurse questions about your health. If needed, they can refer you to other doctors or specialists, too.  Adults older than 65 years often need other care, too. As you get older, your doctor will talk to you about:   How to prevent falling at " home   Hearing or vision tests   Memory testing   How to take your medicines safely   Making sure that you have the help and support you need at home  All topics are updated as new evidence becomes available and our peer review process is complete.  This topic retrieved from [employer] on: May 02, 2024.  Topic 981427 Version 1.0  Release: 32.4.3 - C32.122  © 2024 UpToDate, Inc. and/or its affiliates. All rights reserved.  Consumer Information Use and Disclaimer   Disclaimer: This generalized information is a limited summary of diagnosis, treatment, and/or medication information. It is not meant to be comprehensive and should be used as a tool to help the user understand and/or assess potential diagnostic and treatment options. It does NOT include all information about conditions, treatments, medications, side effects, or risks that may apply to a specific patient. It is not intended to be medical advice or a substitute for the medical advice, diagnosis, or treatment of a health care provider based on the health care provider's examination and assessment of a patient's specific and unique circumstances. Patients must speak with a health care provider for complete information about their health, medical questions, and treatment options, including any risks or benefits regarding use of medications. This information does not endorse any treatments or medications as safe, effective, or approved for treating a specific patient. UpToDate, Inc. and its affiliates disclaim any warranty or liability relating to this information or the use thereof.The use of this information is governed by the Terms of Use, available at https://www.woltersYextuwer.com/en/know/clinical-effectiveness-terms. 2024© UpToDate, Inc. and its affiliates and/or licensors. All rights reserved.  Copyright   © 2024 UpToDate, Inc. and/or its affiliates. All rights reserved.

## 2024-09-26 NOTE — PROGRESS NOTES
Adult Annual Physical  Name: Nellie Carter      : 1975      MRN: [id number]  Encounter Provider: Samantha Bennett MD  Encounter Date: 2024   Encounter department: [facility] PRIMARY CARE    Assessment & Plan  Annual physical exam         Screening for diabetes mellitus    Orders:    CBC and differential    Comprehensive metabolic panel    Urinalysis with microscopic; Future    Thyroid disorder screening    Orders:    TSH, 3rd generation with Free T4 reflex    Lipid screening    Orders:    Lipid panel    Encounter for vitamin deficiency screening    Orders:    Vitamin D 25 hydroxy    History of iron deficiency    Orders:    Iron Panel (Includes Ferritin, Iron Sat%, Iron, and TIBC)    Skin cancer screening    Orders:    Ambulatory Referral to Dermatology; Future    If all is well I will see her back in a year.  Immunizations and preventive care screenings were discussed with patient today. Appropriate education was printed on patient's after visit summary.    Counseling:  See below  This is a pleasant 49-year-old female here to reestablish care with me.  She was seen about 5 years ago.  She had iron deficiency anemia.  She has been seeing a gynecologist regularly.  Did not go for EGD and colonoscopy.  She had not seen blood in the stool lately.  She did a Cologuard which was unremarkable.  Blood work reviewed along with iron studies.  She is scheduled for mammogram in January.  No new concerns in her family history.  Encouraged her to see a dermatologist for whole body skin check.  Dermatology consult will be placed.  Vaccination with COVID and flu recommended.        History of Present Illness     Adult Annual Physical  Review of Systems  Past Medical History   Past Medical History:   Diagnosis Date    Benign neoplasm parotid gland     LEFT SUPERFICIAL PAROTID GLAND, BENIGN    Chlamydia     19-ROUTINE SCREEN    Colon cancer screening     2023-negative    Depression     NO MEDS- DR. PERDOMO     Gonorrhea     21- ROUTINE SCREEN    Pap smear for cervical cancer screening     5/2016-wnl, HRHPV neg; 4/2021-wnl, HRHPV neg; 7/2024-wnl, HRHPV neg    Simple ovarian cyst 10/2010    RIGHT, SIMPLE [id number] MM. STABLE THROUGH AT LEAST 9/13    Varicella      Past Surgical History:   Procedure Laterality Date    BREAST BIOPSY      2011: RIGHT BREAST, BENIGN. DR. VILLALTA; 5/15: LEFT BREAST, BENIGN. HEMATOMA P-OP 9/15; 11/15: LEFT BENIGN CYST AND WALL; 9/15: LEFT AS REACCUMULATED AND NOW COMPOUND CYSTIC STRUCTURE    BREAST BIOPSY      2003/04: MULTIPLE, BENIGN DR. VILLALTA; 5/15: LEFT BENIGN    BREAST CYST EXCISION Bilateral     benign    HYSTEROSCOPY WITH  RESECTION UTERINE TUMOR/FIBROID N/A 11/17/2022    Procedure: HYSTEROSCOPY W/ RESECTION ENDOMETRIAL MASS WITH SYMPHION;  Surgeon: Cathy Juarez MD;  [facility];  Service: Gynecology    KNEE SURGERY Left     CT HYSTEROSCOPY BX ENDOMETRIUM&/POLYPC W/WO D&C N/A 11/17/2022    Procedure: D&C W/HYSTEROSCOPY;  Surgeon: Cathy Juarez MD;  Location: [facility];  Service: Gynecology    REPAIR ANKLE LIGAMENT Left     I ANKLE BONE SPUR    SALIVARY GLAND SURGERY Left 2012    EXCISION OF PARATID TUMOR/GLAND; PAROTID (SUPERFICIAL), BENIGN    SHOULDER SURGERY Right     WISDOM TOOTH EXTRACTION       Family History   Problem Relation Age of Onset    Depression Mother     Hyperlipidemia Mother     Hypertension Mother     No Known Problems Father         estranged    No Known Problems Sister         estranged    No Known Problems Brother         estranged    Coronary artery disease Maternal Grandmother     Cervical cancer Maternal Grandmother         IN 50'S    Diabetes type II Maternal Grandmother     Heart failure Maternal Grandmother     No Known Problems Maternal Grandfather         unsure    No Known Problems Paternal Grandmother     No Known Problems Paternal Grandfather     No Known Problems Maternal Aunt     No Known Problems Maternal Aunt     No Known Problems  "Maternal Aunt     No Known Problems Paternal Aunt     No Known Problems Paternal Aunt     No Known Problems Paternal Aunt     Diabetes type II Other     Breast cancer Neg Hx     Ovarian cancer Neg Hx     Colon cancer Neg Hx      Current Outpatient Medications on File Prior to Visit   Medication Sig Dispense Refill    acetaminophen (TYLENOL) 325 mg tablet Take 650 mg by mouth every 6 (six) hours as needed for mild pain       No current facility-administered medications on file prior to visit.   No Known Allergies   Current Outpatient Medications on File Prior to Visit   Medication Sig Dispense Refill    acetaminophen (TYLENOL) 325 mg tablet Take 650 mg by mouth every 6 (six) hours as needed for mild pain       No current facility-administered medications on file prior to visit.      Social History     Tobacco Use    Smoking status: Former     Current packs/day: 1.00     Types: Cigarettes    Smokeless tobacco: Never    Tobacco comments:     [age] yo    Vaping Use    Vaping status: Never Used   Substance and Sexual Activity    Alcohol use: No    Drug use: No    Sexual activity: Yes     Partners: Male     Birth control/protection: Condom Male     Comment: lifetime partners: 4;  2005       Objective     /60 (BP Location: Left arm, Patient Position: Sitting, Cuff Size: Standard)   Pulse 72   Temp 98.3 °F (36.8 °C) (Temporal)   Ht 5' 5.5\" (1.664 m)   Wt 82.6 kg (182 lb)   SpO2 98%   BMI 29.83 kg/m²     Physical Exam  Constitutional:       General: She is not in acute distress.  Eyes:      Conjunctiva/sclera: Conjunctivae normal.   Pulmonary:      Effort: Pulmonary effort is normal. No respiratory distress.   Skin:     Coloration: Skin is not pale.   Neurological:      Mental Status: She is alert and oriented to person, place, and time.       Administrative Statements   I have spent a total time of 30 minutes in caring for this patient on the day of the visit/encounter including Patient and family " education, Importance of tx compliance, Risk factor reductions, Impressions, Counseling / Coordination of care, and Documenting in the medical record.

## 2024-10-08 ENCOUNTER — HOSPITAL ENCOUNTER (OUTPATIENT)
Dept: ULTRASOUND IMAGING | Facility: HOSPITAL | Age: 49
Discharge: HOME | End: 2024-10-08
Payer: COMMERCIAL

## 2024-10-08 ENCOUNTER — APPOINTMENT (OUTPATIENT)
Dept: LAB | Facility: CLINIC | Age: 49
End: 2024-10-08
Payer: COMMERCIAL

## 2024-10-08 DIAGNOSIS — Z13.1 SCREENING FOR DIABETES MELLITUS: ICD-10-CM

## 2024-10-08 DIAGNOSIS — R92.8 ABNORMALITY OF BOTH BREASTS ON SCREENING MAMMOGRAM: ICD-10-CM

## 2024-10-08 LAB
25(OH)D3 SERPL-MCNC: 19.8 NG/ML (ref 30–100)
ALBUMIN SERPL BCG-MCNC: 4.5 G/DL (ref 3.5–5)
ALP SERPL-CCNC: 63 U/L (ref 34–104)
ALT SERPL W P-5'-P-CCNC: 11 U/L (ref 7–52)
ANION GAP SERPL CALCULATED.3IONS-SCNC: 8 MMOL/L (ref 4–13)
AST SERPL W P-5'-P-CCNC: 12 U/L (ref 13–39)
BACTERIA UR QL AUTO: NORMAL /HPF
BASOPHILS # BLD AUTO: 0.07 THOUSANDS/ΜL (ref 0–0.1)
BASOPHILS NFR BLD AUTO: 1 % (ref 0–1)
BILIRUB SERPL-MCNC: 0.55 MG/DL (ref 0.2–1)
BILIRUB UR QL STRIP: NEGATIVE
BUN SERPL-MCNC: 7 MG/DL (ref 5–25)
CALCIUM SERPL-MCNC: 9.2 MG/DL (ref 8.4–10.2)
CHLORIDE SERPL-SCNC: 106 MMOL/L (ref 96–108)
CHOLEST SERPL-MCNC: 200 MG/DL
CLARITY UR: CLEAR
CO2 SERPL-SCNC: 26 MMOL/L (ref 21–32)
COLOR UR: COLORLESS
CREAT SERPL-MCNC: 0.68 MG/DL (ref 0.6–1.3)
EOSINOPHIL # BLD AUTO: 0.17 THOUSAND/ΜL (ref 0–0.61)
EOSINOPHIL NFR BLD AUTO: 3 % (ref 0–6)
ERYTHROCYTE [DISTWIDTH] IN BLOOD BY AUTOMATED COUNT: 13.9 % (ref 11.6–15.1)
FERRITIN SERPL-MCNC: 5 NG/ML (ref 11–307)
GFR SERPL CREATININE-BSD FRML MDRD: 104 ML/MIN/1.73SQ M
GLUCOSE P FAST SERPL-MCNC: 90 MG/DL (ref 65–99)
GLUCOSE UR STRIP-MCNC: NEGATIVE MG/DL
HCT VFR BLD AUTO: 35.5 % (ref 34.8–46.1)
HDLC SERPL-MCNC: 45 MG/DL
HGB BLD-MCNC: 11.1 G/DL (ref 11.5–15.4)
HGB UR QL STRIP.AUTO: NEGATIVE
IMM GRANULOCYTES # BLD AUTO: 0.02 THOUSAND/UL (ref 0–0.2)
IMM GRANULOCYTES NFR BLD AUTO: 0 % (ref 0–2)
IRON SATN MFR SERPL: 19 % (ref 15–50)
IRON SERPL-MCNC: 68 UG/DL (ref 50–212)
KETONES UR STRIP-MCNC: NEGATIVE MG/DL
LDLC SERPL CALC-MCNC: 133 MG/DL (ref 0–100)
LEUKOCYTE ESTERASE UR QL STRIP: NEGATIVE
LYMPHOCYTES # BLD AUTO: 1.44 THOUSANDS/ΜL (ref 0.6–4.47)
LYMPHOCYTES NFR BLD AUTO: 21 % (ref 14–44)
MCH RBC QN AUTO: 26.5 PG (ref 26.8–34.3)
MCHC RBC AUTO-ENTMCNC: 31.3 G/DL (ref 31.4–37.4)
MCV RBC AUTO: 85 FL (ref 82–98)
MONOCYTES # BLD AUTO: 0.54 THOUSAND/ΜL (ref 0.17–1.22)
MONOCYTES NFR BLD AUTO: 8 % (ref 4–12)
NEUTROPHILS # BLD AUTO: 4.5 THOUSANDS/ΜL (ref 1.85–7.62)
NEUTS SEG NFR BLD AUTO: 67 % (ref 43–75)
NITRITE UR QL STRIP: NEGATIVE
NON-SQ EPI CELLS URNS QL MICRO: NORMAL /HPF
NONHDLC SERPL-MCNC: 155 MG/DL
NRBC BLD AUTO-RTO: 0 /100 WBCS
PH UR STRIP.AUTO: 7 [PH]
PLATELET # BLD AUTO: 351 THOUSANDS/UL (ref 149–390)
PMV BLD AUTO: 10.6 FL (ref 8.9–12.7)
POTASSIUM SERPL-SCNC: 3.8 MMOL/L (ref 3.5–5.3)
PROT SERPL-MCNC: 6.9 G/DL (ref 6.4–8.4)
PROT UR STRIP-MCNC: NEGATIVE MG/DL
RBC # BLD AUTO: 4.19 MILLION/UL (ref 3.81–5.12)
RBC #/AREA URNS AUTO: NORMAL /HPF
SODIUM SERPL-SCNC: 140 MMOL/L (ref 135–147)
SP GR UR STRIP.AUTO: 1.01 (ref 1–1.03)
TIBC SERPL-MCNC: 353 UG/DL (ref 250–450)
TRIGL SERPL-MCNC: 110 MG/DL
TSH SERPL DL<=0.05 MIU/L-ACNC: 1.39 UIU/ML (ref 0.45–4.5)
UIBC SERPL-MCNC: 285 UG/DL (ref 155–355)
UROBILINOGEN UR STRIP-ACNC: <2 MG/DL
WBC # BLD AUTO: 6.74 THOUSAND/UL (ref 4.31–10.16)
WBC #/AREA URNS AUTO: NORMAL /HPF

## 2024-10-08 PROCEDURE — 76642 ULTRASOUND BREAST LIMITED: CPT

## 2024-10-08 PROCEDURE — 81001 URINALYSIS AUTO W/SCOPE: CPT

## 2024-10-10 ENCOUNTER — TELEPHONE (OUTPATIENT)
Age: 49
End: 2024-10-10

## 2024-10-10 NOTE — TELEPHONE ENCOUNTER
----- Message from Samantha Bennett MD sent at 10/10/2024 10:34 AM EDT -----  Please set up a virtual visit to discuss abnormal labs

## 2024-10-11 ENCOUNTER — TELEMEDICINE (OUTPATIENT)
Age: 49
End: 2024-10-11
Payer: COMMERCIAL

## 2024-10-11 DIAGNOSIS — E78.5 HYPERLIPIDEMIA, UNSPECIFIED HYPERLIPIDEMIA TYPE: ICD-10-CM

## 2024-10-11 DIAGNOSIS — R53.83 OTHER FATIGUE: ICD-10-CM

## 2024-10-11 DIAGNOSIS — E55.9 VITAMIN D DEFICIENCY: ICD-10-CM

## 2024-10-11 DIAGNOSIS — D64.9 ANEMIA, UNSPECIFIED TYPE: Primary | ICD-10-CM

## 2024-10-11 PROCEDURE — 99213 OFFICE O/P EST LOW 20 MIN: CPT | Performed by: INTERNAL MEDICINE

## 2024-10-11 RX ORDER — ASCORBIC ACID 500 MG
TABLET ORAL
Qty: 30 TABLET | Refills: 1 | Status: SHIPPED | OUTPATIENT
Start: 2024-10-11

## 2024-10-11 RX ORDER — ERGOCALCIFEROL 1.25 MG/1
50000 CAPSULE, LIQUID FILLED ORAL WEEKLY
Qty: 4 CAPSULE | Refills: 1 | Status: SHIPPED | OUTPATIENT
Start: 2024-10-11

## 2024-10-11 RX ORDER — FERROUS SULFATE 325(65) MG
TABLET, DELAYED RELEASE (ENTERIC COATED) ORAL
Qty: 30 TABLET | Refills: 1 | Status: SHIPPED | OUTPATIENT
Start: 2024-10-11

## 2024-10-11 NOTE — ASSESSMENT & PLAN NOTE
Orders:    CBC and differential    Iron Panel (Includes Ferritin, Iron Sat%, Iron, and TIBC)    ferrous sulfate 325 (65 FE) MG EC tablet; 1 pill Monday Wednesday Friday with vitamin C    ascorbic acid (VITAMIN C) 500 MG tablet; 1 pill Monday Wednesday and Friday with iron

## 2024-10-11 NOTE — PROGRESS NOTES
Assessment/Plan:    No problem-specific Assessment & Plan notes found for this encounter.       {Assess/PlanSmartLinks:64514}      Subjective:      Patient ID: Nellie Carter is a 49 y.o. female.    HPI    {Common ambulatory SmartLinks:64306}    Review of Systems      Objective:      There were no vitals taken for this visit.         Physical Exam

## 2024-10-11 NOTE — PROGRESS NOTES
Virtual Regular Visit  Name: Nellie Carter      : 1975      MRN: [id number]  Encounter Provider: Samantha Bennett MD  Encounter Date: 10/11/2024   Encounter department: [facility]    Verification of patient location:    Patient is located at Home in the following state in which I hold an active license [place]    Assessment & Plan  Anemia, unspecified type    Orders:    CBC and differential    Iron Panel (Includes Ferritin, Iron Sat%, Iron, and TIBC)    ferrous sulfate 325 (65 FE) MG EC tablet; 1 pill  with vitamin C    ascorbic acid (VITAMIN C) 500 MG tablet; 1 pill  and Friday with iron    Vitamin D deficiency    Orders:    ergocalciferol (VITAMIN D2) 50,000 units; Take 1 capsule (50,000 Units total) by mouth once a week    Hyperlipidemia, unspecified hyperlipidemia type         Other fatigue                Encounter provider Samantha Bennett MD    The patient was identified by name and date of birth. Nellie Carter was informed that this is a telemedicine visit and that the visit is being conducted through the [employer] platform. She agrees to proceed..  My office door was closed. No one else was in the room.  She acknowledged consent and understanding of privacy and security of the video platform. The patient has agreed to participate and understands they can discontinue the visit at any time.    Patient is aware this is a billable service.     History of Present Illness     HPI  Patient is being seen through televisit to discuss recent lab studies.  She was noted to have mild anemia with slightly low iron levels and quite low ferritin.  He was started on iron supplement with vitamin C 3 times a week.  Patient encouraged to follow-up with GI for EGD and colonoscopy.  She recently had an D&C for heavy menstrual cycle.  This will also help the matter.  She did have a Cologuard last year which was unremarkable however appropriate for an EGD and a colonoscopy.   I also discussed her elevated LDL.  It has improved slightly as compared to last year.  She will continue working on her diet and exercise repeat blood work in 3 months.  Lastly, vitamin D was low.  Patient start taking 50,000 once a week and then 2000 over-the-counter.  History obtained from : patient  Review of Systems  Pertinent Medical History       Medical History Reviewed by provider this encounter:  Tobacco  Allergies  Meds  Problems  Med Hx  Surg Hx  Fam Hx       Past Medical History   Past Medical History:   Diagnosis Date    Benign neoplasm parotid gland     LEFT SUPERFICIAL PAROTID GLAND, BENIGN    Chlamydia     21-ROUTINE SCREEN    Colon cancer screening     8/2023-negative    Depression     NO MEDS- DR. PERDOMO    Gonorrhea     21- ROUTINE SCREEN    Pap smear for cervical cancer screening     5/2016-wnl, HRHPV neg; 4/2021-wnl, HRHPV neg; 7/2024-wnl, HRHPV neg    Simple ovarian cyst 10/2010    RIGHT, SIMPLE [id number] MM. STABLE THROUGH AT LEAST 9/13    Varicella      Past Surgical History:   Procedure Laterality Date    BREAST BIOPSY      2011: RIGHT BREAST, BENIGN. DR. VILLALTA; 5/15: LEFT BREAST, BENIGN. HEMATOMA P-OP 9/15; 11/15: LEFT BENIGN CYST AND WALL; 9/15: LEFT AS REACCUMULATED AND NOW COMPOUND CYSTIC STRUCTURE    BREAST BIOPSY      2003/04: MULTIPLE, BENIGN DR. VILLALTA; 5/15: LEFT BENIGN    BREAST CYST EXCISION Bilateral     benign    HYSTEROSCOPY WITH  RESECTION UTERINE TUMOR/FIBROID N/A 11/17/2022    Procedure: HYSTEROSCOPY W/ RESECTION ENDOMETRIAL MASS WITH SYMPHION;  Surgeon: Cathy Juarez MD;  [facility];  Service: Gynecology    KNEE SURGERY Left     OH HYSTEROSCOPY BX ENDOMETRIUM&/POLYPC W/WO D&C N/A 11/17/2022    Procedure: D&C W/HYSTEROSCOPY;  Surgeon: Cathy Juarez MD;  [facility];  Service: Gynecology    REPAIR ANKLE LIGAMENT Left     I ANKLE BONE SPUR    SALIVARY GLAND SURGERY Left 2012    EXCISION OF PARATID TUMOR/GLAND; PAROTID (SUPERFICIAL), BENIGN     SHOULDER SURGERY Right     WISDOM TOOTH EXTRACTION       Family History   Problem Relation Age of Onset    Depression Mother     Hyperlipidemia Mother     Hypertension Mother     No Known Problems Father         estranged    No Known Problems Sister         estranged    No Known Problems Brother         estranged    Coronary artery disease Maternal Grandmother     Cervical cancer Maternal Grandmother         IN 50'S    Diabetes type II Maternal Grandmother     Heart failure Maternal Grandmother     No Known Problems Maternal Grandfather         unsure    No Known Problems Paternal Grandmother     No Known Problems Paternal Grandfather     No Known Problems Maternal Aunt     No Known Problems Maternal Aunt     No Known Problems Maternal Aunt     No Known Problems Paternal Aunt     No Known Problems Paternal Aunt     No Known Problems Paternal Aunt     Diabetes type II Other     Breast cancer Neg Hx     Ovarian cancer Neg Hx     Colon cancer Neg Hx      Current Outpatient Medications on File Prior to Visit   Medication Sig Dispense Refill    acetaminophen (TYLENOL) 325 mg tablet Take 650 mg by mouth every 6 (six) hours as needed for mild pain       No current facility-administered medications on file prior to visit.   No Known Allergies   Current Outpatient Medications on File Prior to Visit   Medication Sig Dispense Refill    acetaminophen (TYLENOL) 325 mg tablet Take 650 mg by mouth every 6 (six) hours as needed for mild pain       No current facility-administered medications on file prior to visit.      Social History     Tobacco Use    Smoking status: Former     Current packs/day: 1.00     Types: Cigarettes    Smokeless tobacco: Never    Tobacco comments:     [age] yo    Vaping Use    Vaping status: Never Used   Substance and Sexual Activity    Alcohol use: No    Drug use: No    Sexual activity: Yes     Partners: Male     Birth control/protection: Condom Male     Comment: lifetime partners: 4;  2005          Objective     There were no vitals taken for this visit.  Physical Exam    Visit Time  Total Visit Duration: 10

## 2024-10-14 ENCOUNTER — TELEPHONE (OUTPATIENT)
Age: 49
End: 2024-10-14

## 2024-10-14 NOTE — TELEPHONE ENCOUNTER
Pt called in stating she recently had abnormal blood work with her PCP. States she is concerned for anemia at this time and her PCP is following up with her about this. Their recommendation at this time is for her to have a colonoscopy. Pt wants Dr. Juarez to be aware/review labs in case this has to do with her periods. States she last had her period about 2 weeks ago. States it was heavy but she cannot remember how frequently she was changing a pad. Advised will review with Dr. Juarez. Pt thankful.

## 2024-10-15 NOTE — TELEPHONE ENCOUNTER
Please advise the patient that her hemoglobin is mildly decreased (11.1). It was previously 9.1 when she was having extremely heavy periods.   I would recommend she come in to schedule a gyn evaluation. She can schedule it at her convenience or if she prefers she can wait to see if her GI evaluation is negative first.

## 2024-10-17 NOTE — TELEPHONE ENCOUNTER
Spoke with patient, reviewed message per Dr. Juarez. Patient would like to schedule appointment. No appointment with Dr. Juarez for over a month - trying to schedule patient with in 2 week. Attempted warm transfer to office who also did not see availability and recommended message to practice coordinator. Message to clerical pool and Lorena.

## 2024-10-17 NOTE — TELEPHONE ENCOUNTER
Pt returning call for Dr Juarez results and f/u . No answer from cts. Pt made aware of message sent.

## 2024-10-23 ENCOUNTER — OFFICE VISIT (OUTPATIENT)
Dept: OBGYN CLINIC | Facility: CLINIC | Age: 49
End: 2024-10-23
Payer: COMMERCIAL

## 2024-10-23 VITALS
DIASTOLIC BLOOD PRESSURE: 82 MMHG | HEIGHT: 66 IN | WEIGHT: 175.4 LBS | SYSTOLIC BLOOD PRESSURE: 124 MMHG | BODY MASS INDEX: 28.19 KG/M2

## 2024-10-23 DIAGNOSIS — Z86.018 HISTORY OF UTERINE FIBROID: ICD-10-CM

## 2024-10-23 DIAGNOSIS — N92.6 MENSTRUAL PROBLEM: ICD-10-CM

## 2024-10-23 DIAGNOSIS — D50.0 ANEMIA DUE TO CHRONIC BLOOD LOSS: Primary | ICD-10-CM

## 2024-10-23 PROCEDURE — 99213 OFFICE O/P EST LOW 20 MIN: CPT | Performed by: OBSTETRICS & GYNECOLOGY

## 2024-10-23 NOTE — PROGRESS NOTES
Patient is a 49 y.o.  with Patient's last menstrual period was 10/03/2024 (exact date). who presents requesting evaluation of anemia.  She reports that her PCP did blood work in October and was told she has anemia. She was told to have a colonoscopy and she also decided to see me as she has had menorrhagia in the past. On review of records, her hemoglobin of 11.1. She was started on iron and vitamin C and has follow up blood work ordered.   Reviewed with patient that in  her hemoglobin was 9.4. She underwent D&C, hysteroscopy with removal of submucosal fibroid in 2022. After surgery, her heavy menses and her hemoglobin returned to 11.9.  She reported in 2024 that her menses were every 22-26 days lasting 4-5 days and only required a regular pad every 4-5 hours. She reports that this has been consistent except for the last 2 menses and night time she has required an overnight pad which lasts her all night without leakage.   Reviewed with patient that I recommend repeating her ultrasound to see if she has another submucosal fibroid to see if this is why her menses are slightly heavier than after surgery.  We reviewed management options including TXA, Mirena or repeat surgery if needed. Pt will consider and once u/s results are available we will make a decision regarding treatment plan.       Past Medical History:   Diagnosis Date    Benign neoplasm parotid gland     LEFT SUPERFICIAL PAROTID GLAND, BENIGN    Chlamydia     21-ROUTINE SCREEN    Colon cancer screening     2023-negative    Depression     NO MEDS- DR. PERDOMO    Gonorrhea     - ROUTINE SCREEN    Pap smear for cervical cancer screening     2016-wnl, HRHPV neg; 2021-wnl, HRHPV neg; 2024-wnl, HRHPV neg    Simple ovarian cyst 10/2010    RIGHT, SIMPLE [id number] MM. STABLE THROUGH AT LEAST 9/13    Varicella        Past Surgical History:   Procedure Laterality Date    BREAST BIOPSY      : RIGHT BREAST, BENIGN. DR. VILLALTA; 5/15:  LEFT BREAST, BENIGN. HEMATOMA P-OP 9/15; 11/15: LEFT BENIGN CYST AND WALL; 9/15: LEFT AS REACCUMULATED AND NOW COMPOUND CYSTIC STRUCTURE    BREAST BIOPSY      : MULTIPLE, BENIGN DR. VILLALTA; 5/15: LEFT BENIGN    BREAST CYST EXCISION Bilateral     benign    HYSTEROSCOPY WITH  RESECTION UTERINE TUMOR/FIBROID N/A 2022    Procedure: HYSTEROSCOPY W/ RESECTION ENDOMETRIAL MASS WITH SYMPHION;  Surgeon: Cathy Juarez MD;  [facility];  Service: Gynecology    KNEE SURGERY Left     NE HYSTEROSCOPY BX ENDOMETRIUM&/POLYPC W/WO D&C N/A 2022    Procedure: D&C W/HYSTEROSCOPY;  Surgeon: Cathy Juarez MD;  [facility];  Service: Gynecology    REPAIR ANKLE LIGAMENT Left     I ANKLE BONE SPUR    SALIVARY GLAND SURGERY Left     EXCISION OF PARATID TUMOR/GLAND; PAROTID (SUPERFICIAL), BENIGN    SHOULDER SURGERY Right     WISDOM TOOTH EXTRACTION         OB History    Para Term  AB Living   0 0 0 0 0 0   SAB IAB Ectopic Multiple Live Births   0 0 0 0 0   Obstetric Comments   Menarche: 14      Menses: 22-26/5/regular pad every 4-5 hours           Current Outpatient Medications:     ascorbic acid (VITAMIN C) 500 MG tablet, 1 pill  and Friday with iron, Disp: 30 tablet, Rfl: 1    ergocalciferol (VITAMIN D2) 50,000 units, Take 1 capsule (50,000 Units total) by mouth once a week, Disp: 4 capsule, Rfl: 1    ferrous sulfate 325 (65 FE) MG EC tablet, 1 pill  with vitamin C, Disp: 30 tablet, Rfl: 1    No Known Allergies    Social History     Socioeconomic History    Marital status: /Civil Union     Spouse name: Miels    Number of children: 0    Years of education: None    Highest education level: High school graduate   Occupational History    Occupation:    Tobacco Use    Smoking status: Former     Current packs/day: 1.00     Types: Cigarettes    Smokeless tobacco: Never    Tobacco comments:     [age] yo    Vaping Use    Vaping  status: Never Used   Substance and Sexual Activity    Alcohol use: No    Drug use: No    Sexual activity: Yes     Partners: Male     Birth control/protection: Condom Male     Comment: lifetime partners: 4;  2005   Other Topics Concern    None   Social History Narrative    Mormon: no preference    Accepts blood products                Exercise: Farm work daily    Calcium: 1 cheese everyday, 1 c almond milk daily,      Social Determinants of Health     Financial Resource Strain: Not on file   Food Insecurity: Not on file   Transportation Needs: Not on file   Physical Activity: Not on file   Stress: Not on file   Social Connections: Not on file   Intimate Partner Violence: Not on file   Housing Stability: Not on file       Family History   Problem Relation Age of Onset    Depression Mother     Hyperlipidemia Mother     Hypertension Mother     No Known Problems Father         estranged    No Known Problems Sister         estranged    No Known Problems Brother         estranged    Coronary artery disease Maternal Grandmother     Cervical cancer Maternal Grandmother         IN 50'S    Diabetes type II Maternal Grandmother     Heart failure Maternal Grandmother     No Known Problems Maternal Grandfather         unsure    No Known Problems Paternal Grandmother     No Known Problems Paternal Grandfather     No Known Problems Maternal Aunt     No Known Problems Maternal Aunt     No Known Problems Maternal Aunt     No Known Problems Paternal Aunt     No Known Problems Paternal Aunt     No Known Problems Paternal Aunt     Diabetes type II Other     Breast cancer Neg Hx     Ovarian cancer Neg Hx     Colon cancer Neg Hx        Review of Systems   Constitutional:  Positive for fatigue. Negative for chills, fever and unexpected weight change.   HENT:  Negative for congestion, mouth sores and sore throat.    Respiratory:  Negative for cough, chest tightness, shortness of breath and wheezing.    Cardiovascular:  Negative for  "chest pain and palpitations.   Gastrointestinal:  Negative for abdominal distention, abdominal pain, constipation, diarrhea, nausea and vomiting.   Endocrine: Negative for cold intolerance and heat intolerance.   Genitourinary:  Positive for menstrual problem. Negative for dyspareunia, dysuria, genital sores, pelvic pain, vaginal bleeding, vaginal discharge and vaginal pain.   Musculoskeletal:  Negative for arthralgias.   Skin:  Negative for color change and rash.   Neurological:  Negative for dizziness, light-headedness and headaches.   Hematological:  Negative for adenopathy.       Blood pressure 124/82, height 5' 5.5\" (1.664 m), weight 79.6 kg (175 lb 6.4 oz), last menstrual period 10/03/2024, not currently breastfeeding. and Body mass index is 28.74 kg/m².    Physical Exam  Constitutional:       General: She is not in acute distress.     Appearance: Normal appearance. She is normal weight. She is not ill-appearing.   HENT:      Head: Normocephalic and atraumatic.   Eyes:      Extraocular Movements: Extraocular movements intact.      Conjunctiva/sclera: Conjunctivae normal.   Pulmonary:      Effort: Pulmonary effort is normal.   Abdominal:      General: Abdomen is flat. There is no distension.      Palpations: Abdomen is soft. There is no mass.      Tenderness: There is no abdominal tenderness. There is no guarding or rebound.      Hernia: No hernia is present.   Musculoskeletal:         General: Normal range of motion.      Cervical back: Normal range of motion.      Right lower leg: No edema.      Left lower leg: No edema.   Skin:     General: Skin is warm.      Findings: No erythema or rash.   Neurological:      Mental Status: She is alert and oriented to person, place, and time.   Psychiatric:         Mood and Affect: Mood normal.         Behavior: Behavior normal.         Thought Content: Thought content normal.         Judgment: Judgment normal.          vulva: normal external genitalia for age and no " lesions, masses, epithelial changes, or exudate  vagina: color pink, rugae  well formed rugae, and bleeding  with bleeding which is scant, noted on cervix  cervix: no lesions   uterus: NSSC, AF, NT, mobile  adnexa: no masses or tenderness      A/P:  Pt is a 49 y.o.  with      Nellie was seen today for follow-up.    Diagnoses and all orders for this visit:    Anemia due to chronic blood loss  -     US pelvis complete w transvaginal; Future  -Hgb 11.1. Was previously 9.2 when she had a sumbucosal fibroid requiring excision.Will await u/s results to see if recurrent  -Advised patient to follow up per PCP and have a colonoscopy  -Pt considering Mirena vs. TXA vs. Repeat surgery if needed    History of uterine fibroid  -     US pelvis complete w transvaginal; Future    Menstrual problem  -slightly heavier than after surgical management of submucosal fibroid, but not heavy overall. Await u/s results

## 2024-11-11 ENCOUNTER — HOSPITAL ENCOUNTER (OUTPATIENT)
Dept: ULTRASOUND IMAGING | Facility: MEDICAL CENTER | Age: 49
Discharge: HOME | End: 2024-11-11
Payer: COMMERCIAL

## 2024-11-11 DIAGNOSIS — Z86.018 HISTORY OF UTERINE FIBROID: ICD-10-CM

## 2024-11-11 DIAGNOSIS — D50.0 ANEMIA DUE TO CHRONIC BLOOD LOSS: ICD-10-CM

## 2024-11-11 PROCEDURE — 76856 US EXAM PELVIC COMPLETE: CPT

## 2024-11-11 PROCEDURE — 76830 TRANSVAGINAL US NON-OB: CPT

## 2024-11-18 ENCOUNTER — RESULTS FOLLOW-UP (OUTPATIENT)
Dept: OBGYN CLINIC | Facility: CLINIC | Age: 49
End: 2024-11-18

## 2024-12-05 ENCOUNTER — OFFICE VISIT (OUTPATIENT)
Dept: OBGYN CLINIC | Facility: CLINIC | Age: 49
End: 2024-12-05
Payer: COMMERCIAL

## 2024-12-05 VITALS
WEIGHT: 174.2 LBS | HEIGHT: 66 IN | DIASTOLIC BLOOD PRESSURE: 86 MMHG | SYSTOLIC BLOOD PRESSURE: 136 MMHG | BODY MASS INDEX: 28 KG/M2

## 2024-12-05 DIAGNOSIS — N90.7 SEBACEOUS CYST OF LABIA: Primary | ICD-10-CM

## 2024-12-05 PROCEDURE — 99213 OFFICE O/P EST LOW 20 MIN: CPT | Performed by: NURSE PRACTITIONER

## 2024-12-05 NOTE — PROGRESS NOTES
"Assessment / Plan    Assessment & Plan  Sebaceous cyst of labia   2 mm  Discussed findings with patient and reassured of benign nature and that it is essentially a pimple.  Advised to continue soaking with hot compresses and to use topical antibiotic ointment.  If increases in size or becomes painful advised her to follow up.           Subjective   PROBLEM VISIT     Nellie Carter is a 49 y.o. female PROBLEM VISIT  CC: labial sore    50 yo G0  Patient of Dr. Marr; last yearly 2024    Reports a sore on her left labia.  Onset: 3 days ago  Location: vulva, labia  Duration: not sure how long she has had it  Characteristics: ~ 1/4 inch size, appeared to have a head on it, tender upon touching   Treatments: soaked area for a day  Trims pubic hair, no razor.    Pertinent history:  Chlamydia/ GC, age 21  Known bartholin's gland cyst, right side; stable; has been I&D'ed twice.  Just watching for now.    Menstrual History:  OB History          0    Para   0    Term   0       0    AB   0    Living   0         SAB   0    IAB   0    Ectopic   0    Multiple   0    Live Births   0           Obstetric Comments   Menarche: 14    Menses: 22-26/5/regular pad every 4-5 hours                  Patient's last menstrual period was 2024 (exact date).       The following portions of the patient's history were reviewed and updated as appropriate: allergies, current medications, past family history, past medical history, past social history, past surgical history, and problem list.    Review of Systems      Review of Systems   Constitutional:  Negative for chills and fever.   Genitourinary:  Positive for genital sores. Negative for dyspareunia, dysuria, frequency, hematuria, menstrual problem, pelvic pain, urgency, vaginal bleeding, vaginal discharge and vaginal pain.       Objective     Visit Vitals  /86 (BP Location: Right arm, Patient Position: Sitting, Cuff Size: Standard)   Ht 5' 5.5\" (1.664 m)   Wt 79 kg " "(174 lb 3.2 oz)   LMP 11/19/2024 (Exact Date)   BMI 28.55 kg/m²   OB Status Unknown   Smoking Status Former   BSA 1.88 m²      *patient agrees to exam without a chaperone present.       /86 (BP Location: Right arm, Patient Position: Sitting, Cuff Size: Standard)   Ht 5' 5.5\" (1.664 m)   Wt 79 kg (174 lb 3.2 oz)   LMP 11/19/2024 (Exact Date)   BMI 28.55 kg/m²   Physical Exam  Constitutional:       General: She is not in acute distress.     Appearance: Normal appearance. She is not ill-appearing or diaphoretic.      Comments: Body mass index is 28.55 kg/m².     HENT:      Head: Normocephalic and atraumatic.   Eyes:      Pupils: Pupils are equal, round, and reactive to light.   Pulmonary:      Effort: Pulmonary effort is normal.   Genitourinary:     General: Normal vulva.      Exam position: Lithotomy position.          Comments: ~ 2 mm white head of sebaceous cyst    Right bartholin's cyst, known, stable, patient is observing only.  Skin:     General: Skin is warm and dry.   Neurological:      General: No focal deficit present.      Mental Status: She is alert and oriented to person, place, and time.   Psychiatric:         Mood and Affect: Mood normal.         Behavior: Behavior normal.         Thought Content: Thought content normal.         Judgment: Judgment normal.                 "

## (undated) DEVICE — LIGHT GLOVE GREEN

## (undated) DEVICE — TISSUE REMOVAL SYSTEM RESECTING DEVICE: Brand: SYMPHION

## (undated) DEVICE — PVC URETHRAL CATHETER: Brand: DOVER

## (undated) DEVICE — GLOVE PI ULTRA TOUCH SZ.7.0

## (undated) DEVICE — SCD SEQUENTIAL COMPRESSION COMFORT SLEEVE MEDIUM KNEE LENGTH: Brand: KENDALL SCD

## (undated) DEVICE — 2000CC GUARDIAN II: Brand: GUARDIAN

## (undated) DEVICE — UNDER BUTTOCKS DRAPE W/FLUID CONTROL POUCH: Brand: CONVERTORS

## (undated) DEVICE — PREMIUM DRY TRAY LF: Brand: MEDLINE INDUSTRIES, INC.

## (undated) DEVICE — STRL ALLENTOWN HYSTEROSCOPY PK: Brand: CARDINAL HEALTH

## (undated) DEVICE — GLOVE INDICATOR PI UNDERGLOVE SZ 7.5 BLUE

## (undated) DEVICE — TISSUE REMOVAL SYSTEM FLUID MANAGEMENT ACCESSORIES: Brand: SYMPHION